# Patient Record
Sex: FEMALE | Race: BLACK OR AFRICAN AMERICAN | Employment: FULL TIME | URBAN - METROPOLITAN AREA
[De-identification: names, ages, dates, MRNs, and addresses within clinical notes are randomized per-mention and may not be internally consistent; named-entity substitution may affect disease eponyms.]

---

## 2017-06-08 PROBLEM — F32.A DEPRESSION: Chronic | Status: ACTIVE | Noted: 2017-06-08

## 2017-06-08 PROBLEM — I10 HYPERTENSION: Chronic | Status: ACTIVE | Noted: 2017-06-08

## 2017-06-08 PROBLEM — J18.9 COMMUNITY ACQUIRED PNEUMONIA: Status: ACTIVE | Noted: 2017-06-08

## 2017-06-08 PROBLEM — J96.00 ACUTE RESPIRATORY FAILURE (HCC): Status: ACTIVE | Noted: 2017-06-08

## 2017-06-10 PROBLEM — E11.9 DM2 (DIABETES MELLITUS, TYPE 2) (HCC): Status: ACTIVE | Noted: 2017-06-10

## 2017-06-10 PROBLEM — K21.9 GERD (GASTROESOPHAGEAL REFLUX DISEASE): Status: ACTIVE | Noted: 2017-06-10

## 2019-07-24 ENCOUNTER — APPOINTMENT (OUTPATIENT)
Dept: GENERAL RADIOLOGY | Age: 49
DRG: 329 | End: 2019-07-24
Payer: COMMERCIAL

## 2019-07-24 ENCOUNTER — APPOINTMENT (OUTPATIENT)
Dept: CT IMAGING | Age: 49
DRG: 329 | End: 2019-07-24
Payer: COMMERCIAL

## 2019-07-24 ENCOUNTER — HOSPITAL ENCOUNTER (INPATIENT)
Age: 49
LOS: 9 days | Discharge: HOME | DRG: 329 | End: 2019-08-02
Attending: EMERGENCY MEDICINE | Admitting: HOSPITALIST
Payer: COMMERCIAL

## 2019-07-24 DIAGNOSIS — K56.609 SBO (SMALL BOWEL OBSTRUCTION) (HCC): Primary | ICD-10-CM

## 2019-07-24 DIAGNOSIS — N39.0 URINARY TRACT INFECTION IN FEMALE: ICD-10-CM

## 2019-07-24 LAB
A/G RATIO: 1.3 (ref 1.1–2.2)
ALBUMIN SERPL-MCNC: 4.7 G/DL (ref 3.4–5)
ALP BLD-CCNC: 75 U/L (ref 40–129)
ALT SERPL-CCNC: 32 U/L (ref 10–40)
AMYLASE: 36 U/L (ref 25–115)
ANION GAP SERPL CALCULATED.3IONS-SCNC: 15 MMOL/L (ref 3–16)
AST SERPL-CCNC: 34 U/L (ref 15–37)
BACTERIA: ABNORMAL /HPF
BASOPHILS ABSOLUTE: 0 K/UL (ref 0–0.2)
BASOPHILS RELATIVE PERCENT: 0.3 %
BILIRUB SERPL-MCNC: 0.5 MG/DL (ref 0–1)
BILIRUBIN URINE: NEGATIVE
BLOOD, URINE: NEGATIVE
BUN BLDV-MCNC: 8 MG/DL (ref 7–20)
CALCIUM SERPL-MCNC: 10.3 MG/DL (ref 8.3–10.6)
CHLORIDE BLD-SCNC: 99 MMOL/L (ref 99–110)
CLARITY: ABNORMAL
CO2: 25 MMOL/L (ref 21–32)
COLOR: YELLOW
CREAT SERPL-MCNC: 0.6 MG/DL (ref 0.6–1.1)
EOSINOPHILS ABSOLUTE: 0.1 K/UL (ref 0–0.6)
EOSINOPHILS RELATIVE PERCENT: 1 %
EPITHELIAL CELLS, UA: 23 /HPF (ref 0–5)
GFR AFRICAN AMERICAN: >60
GFR NON-AFRICAN AMERICAN: >60
GLOBULIN: 3.7 G/DL
GLUCOSE BLD-MCNC: 133 MG/DL (ref 70–99)
GLUCOSE URINE: NEGATIVE MG/DL
HCG(URINE) PREGNANCY TEST: NEGATIVE
HCT VFR BLD CALC: 37.2 % (ref 36–48)
HEMOGLOBIN: 12 G/DL (ref 12–16)
HYALINE CASTS: 7 /LPF (ref 0–8)
KETONES, URINE: ABNORMAL MG/DL
LEUKOCYTE ESTERASE, URINE: NEGATIVE
LIPASE: 20 U/L (ref 13–60)
LYMPHOCYTES ABSOLUTE: 0.9 K/UL (ref 1–5.1)
LYMPHOCYTES RELATIVE PERCENT: 14.7 %
MCH RBC QN AUTO: 26.4 PG (ref 26–34)
MCHC RBC AUTO-ENTMCNC: 32.2 G/DL (ref 31–36)
MCV RBC AUTO: 81.8 FL (ref 80–100)
MICROSCOPIC EXAMINATION: YES
MONOCYTES ABSOLUTE: 0.3 K/UL (ref 0–1.3)
MONOCYTES RELATIVE PERCENT: 4.4 %
NEUTROPHILS ABSOLUTE: 4.9 K/UL (ref 1.7–7.7)
NEUTROPHILS RELATIVE PERCENT: 79.6 %
NITRITE, URINE: POSITIVE
PDW BLD-RTO: 14.1 % (ref 12.4–15.4)
PH UA: 6 (ref 5–8)
PLATELET # BLD: 325 K/UL (ref 135–450)
PMV BLD AUTO: 7.1 FL (ref 5–10.5)
POTASSIUM SERPL-SCNC: 3.4 MMOL/L (ref 3.5–5.1)
PROTEIN UA: 30 MG/DL
RBC # BLD: 4.55 M/UL (ref 4–5.2)
RBC UA: 4 /HPF (ref 0–4)
SODIUM BLD-SCNC: 139 MMOL/L (ref 136–145)
SPECIFIC GRAVITY UA: 1.02 (ref 1–1.03)
TOTAL PROTEIN: 8.4 G/DL (ref 6.4–8.2)
URINE TYPE: ABNORMAL
UROBILINOGEN, URINE: 0.2 E.U./DL
WBC # BLD: 6.2 K/UL (ref 4–11)
WBC UA: 11 /HPF (ref 0–5)

## 2019-07-24 PROCEDURE — 82150 ASSAY OF AMYLASE: CPT

## 2019-07-24 PROCEDURE — 99253 IP/OBS CNSLTJ NEW/EST LOW 45: CPT | Performed by: SURGERY

## 2019-07-24 PROCEDURE — 4500000025 HC ED LEVEL 5 PROCEDURE

## 2019-07-24 PROCEDURE — 96374 THER/PROPH/DIAG INJ IV PUSH: CPT

## 2019-07-24 PROCEDURE — 6360000002 HC RX W HCPCS: Performed by: HOSPITALIST

## 2019-07-24 PROCEDURE — 74018 RADEX ABDOMEN 1 VIEW: CPT

## 2019-07-24 PROCEDURE — 0D9670Z DRAINAGE OF STOMACH WITH DRAINAGE DEVICE, VIA NATURAL OR ARTIFICIAL OPENING: ICD-10-PCS | Performed by: INTERNAL MEDICINE

## 2019-07-24 PROCEDURE — 6360000002 HC RX W HCPCS: Performed by: NURSE PRACTITIONER

## 2019-07-24 PROCEDURE — 6360000002 HC RX W HCPCS: Performed by: EMERGENCY MEDICINE

## 2019-07-24 PROCEDURE — 2580000003 HC RX 258: Performed by: NURSE PRACTITIONER

## 2019-07-24 PROCEDURE — 85025 COMPLETE CBC W/AUTO DIFF WBC: CPT

## 2019-07-24 PROCEDURE — 99285 EMERGENCY DEPT VISIT HI MDM: CPT

## 2019-07-24 PROCEDURE — 1200000000 HC SEMI PRIVATE

## 2019-07-24 PROCEDURE — 6360000002 HC RX W HCPCS: Performed by: SURGERY

## 2019-07-24 PROCEDURE — C9113 INJ PANTOPRAZOLE SODIUM, VIA: HCPCS | Performed by: SURGERY

## 2019-07-24 PROCEDURE — 80053 COMPREHEN METABOLIC PANEL: CPT

## 2019-07-24 PROCEDURE — 2580000003 HC RX 258: Performed by: EMERGENCY MEDICINE

## 2019-07-24 PROCEDURE — 84703 CHORIONIC GONADOTROPIN ASSAY: CPT

## 2019-07-24 PROCEDURE — 6360000004 HC RX CONTRAST MEDICATION: Performed by: NURSE PRACTITIONER

## 2019-07-24 PROCEDURE — 96361 HYDRATE IV INFUSION ADD-ON: CPT

## 2019-07-24 PROCEDURE — 83690 ASSAY OF LIPASE: CPT

## 2019-07-24 PROCEDURE — 2580000003 HC RX 258: Performed by: HOSPITALIST

## 2019-07-24 PROCEDURE — 94760 N-INVAS EAR/PLS OXIMETRY 1: CPT

## 2019-07-24 PROCEDURE — 96376 TX/PRO/DX INJ SAME DRUG ADON: CPT

## 2019-07-24 PROCEDURE — 2500000003 HC RX 250 WO HCPCS: Performed by: HOSPITALIST

## 2019-07-24 PROCEDURE — 96375 TX/PRO/DX INJ NEW DRUG ADDON: CPT

## 2019-07-24 PROCEDURE — 80074 ACUTE HEPATITIS PANEL: CPT

## 2019-07-24 PROCEDURE — 36415 COLL VENOUS BLD VENIPUNCTURE: CPT

## 2019-07-24 PROCEDURE — 81001 URINALYSIS AUTO W/SCOPE: CPT

## 2019-07-24 PROCEDURE — 74177 CT ABD & PELVIS W/CONTRAST: CPT

## 2019-07-24 RX ORDER — 0.9 % SODIUM CHLORIDE 0.9 %
1000 INTRAVENOUS SOLUTION INTRAVENOUS ONCE
Status: COMPLETED | OUTPATIENT
Start: 2019-07-24 | End: 2019-07-24

## 2019-07-24 RX ORDER — MORPHINE SULFATE 4 MG/ML
4 INJECTION, SOLUTION INTRAMUSCULAR; INTRAVENOUS ONCE
Status: COMPLETED | OUTPATIENT
Start: 2019-07-24 | End: 2019-07-24

## 2019-07-24 RX ORDER — LISINOPRIL 40 MG/1
40 TABLET ORAL DAILY
COMMUNITY
Start: 2019-06-25

## 2019-07-24 RX ORDER — SODIUM CHLORIDE 9 MG/ML
INJECTION, SOLUTION INTRAVENOUS CONTINUOUS
Status: DISCONTINUED | OUTPATIENT
Start: 2019-07-24 | End: 2019-07-26

## 2019-07-24 RX ORDER — LABETALOL HYDROCHLORIDE 5 MG/ML
10 INJECTION, SOLUTION INTRAVENOUS EVERY 6 HOURS PRN
Status: DISCONTINUED | OUTPATIENT
Start: 2019-07-24 | End: 2019-07-30

## 2019-07-24 RX ORDER — HYDRALAZINE HYDROCHLORIDE 20 MG/ML
10 INJECTION INTRAMUSCULAR; INTRAVENOUS EVERY 6 HOURS PRN
Status: DISCONTINUED | OUTPATIENT
Start: 2019-07-24 | End: 2019-07-29

## 2019-07-24 RX ORDER — CIPROFLOXACIN 2 MG/ML
400 INJECTION, SOLUTION INTRAVENOUS EVERY 12 HOURS
Status: DISCONTINUED | OUTPATIENT
Start: 2019-07-25 | End: 2019-08-02 | Stop reason: HOSPADM

## 2019-07-24 RX ORDER — CIPROFLOXACIN 2 MG/ML
400 INJECTION, SOLUTION INTRAVENOUS ONCE
Status: COMPLETED | OUTPATIENT
Start: 2019-07-24 | End: 2019-07-24

## 2019-07-24 RX ORDER — ONDANSETRON 2 MG/ML
4 INJECTION INTRAMUSCULAR; INTRAVENOUS ONCE
Status: COMPLETED | OUTPATIENT
Start: 2019-07-24 | End: 2019-07-24

## 2019-07-24 RX ORDER — ONDANSETRON 2 MG/ML
4 INJECTION INTRAMUSCULAR; INTRAVENOUS EVERY 6 HOURS PRN
Status: DISCONTINUED | OUTPATIENT
Start: 2019-07-24 | End: 2019-07-24

## 2019-07-24 RX ORDER — PANTOPRAZOLE SODIUM 40 MG/10ML
40 INJECTION, POWDER, LYOPHILIZED, FOR SOLUTION INTRAVENOUS DAILY
Status: DISCONTINUED | OUTPATIENT
Start: 2019-07-24 | End: 2019-08-02 | Stop reason: HOSPADM

## 2019-07-24 RX ORDER — ONDANSETRON 2 MG/ML
4 INJECTION INTRAMUSCULAR; INTRAVENOUS EVERY 6 HOURS PRN
Status: DISCONTINUED | OUTPATIENT
Start: 2019-07-24 | End: 2019-08-02 | Stop reason: HOSPADM

## 2019-07-24 RX ORDER — MORPHINE SULFATE 4 MG/ML
4 INJECTION, SOLUTION INTRAMUSCULAR; INTRAVENOUS
Status: DISCONTINUED | OUTPATIENT
Start: 2019-07-24 | End: 2019-07-24

## 2019-07-24 RX ORDER — BUPROPION HYDROCHLORIDE 150 MG/1
150 TABLET ORAL EVERY MORNING
COMMUNITY

## 2019-07-24 RX ORDER — ATORVASTATIN CALCIUM 10 MG/1
TABLET, FILM COATED ORAL
Refills: 0 | COMMUNITY
Start: 2019-06-25

## 2019-07-24 RX ORDER — DULOXETIN HYDROCHLORIDE 60 MG/1
60 CAPSULE, DELAYED RELEASE ORAL DAILY
COMMUNITY

## 2019-07-24 RX ORDER — MORPHINE SULFATE 4 MG/ML
4 INJECTION, SOLUTION INTRAMUSCULAR; INTRAVENOUS EVERY 4 HOURS PRN
Status: DISCONTINUED | OUTPATIENT
Start: 2019-07-24 | End: 2019-07-25

## 2019-07-24 RX ORDER — LORAZEPAM 2 MG/ML
1 INJECTION INTRAMUSCULAR ONCE
Status: COMPLETED | OUTPATIENT
Start: 2019-07-24 | End: 2019-07-24

## 2019-07-24 RX ORDER — SODIUM CHLORIDE 0.9 % (FLUSH) 0.9 %
10 SYRINGE (ML) INJECTION EVERY 12 HOURS SCHEDULED
Status: DISCONTINUED | OUTPATIENT
Start: 2019-07-24 | End: 2019-08-02 | Stop reason: HOSPADM

## 2019-07-24 RX ORDER — SODIUM CHLORIDE 0.9 % (FLUSH) 0.9 %
10 SYRINGE (ML) INJECTION PRN
Status: DISCONTINUED | OUTPATIENT
Start: 2019-07-24 | End: 2019-08-02 | Stop reason: HOSPADM

## 2019-07-24 RX ADMIN — LABETALOL HYDROCHLORIDE 10 MG: 5 INJECTION, SOLUTION INTRAVENOUS at 18:38

## 2019-07-24 RX ADMIN — MORPHINE SULFATE 4 MG: 4 INJECTION INTRAVENOUS at 16:46

## 2019-07-24 RX ADMIN — SODIUM CHLORIDE 1000 ML: 9 INJECTION, SOLUTION INTRAVENOUS at 17:46

## 2019-07-24 RX ADMIN — LORAZEPAM 1 MG: 2 INJECTION INTRAMUSCULAR; INTRAVENOUS at 17:40

## 2019-07-24 RX ADMIN — MORPHINE SULFATE 4 MG: 4 INJECTION INTRAVENOUS at 14:36

## 2019-07-24 RX ADMIN — ONDANSETRON 4 MG: 2 INJECTION INTRAMUSCULAR; INTRAVENOUS at 16:46

## 2019-07-24 RX ADMIN — HYDRALAZINE HYDROCHLORIDE 10 MG: 20 INJECTION INTRAMUSCULAR; INTRAVENOUS at 23:51

## 2019-07-24 RX ADMIN — ONDANSETRON 4 MG: 2 INJECTION INTRAMUSCULAR; INTRAVENOUS at 14:36

## 2019-07-24 RX ADMIN — PANTOPRAZOLE SODIUM 40 MG: 40 INJECTION, POWDER, FOR SOLUTION INTRAVENOUS at 20:42

## 2019-07-24 RX ADMIN — SODIUM CHLORIDE 1000 ML: 9 INJECTION, SOLUTION INTRAVENOUS at 14:36

## 2019-07-24 RX ADMIN — CIPROFLOXACIN 400 MG: 2 INJECTION, SOLUTION INTRAVENOUS at 14:36

## 2019-07-24 RX ADMIN — ENOXAPARIN SODIUM 40 MG: 40 INJECTION SUBCUTANEOUS at 20:42

## 2019-07-24 RX ADMIN — ONDANSETRON 4 MG: 2 INJECTION INTRAMUSCULAR; INTRAVENOUS at 20:11

## 2019-07-24 RX ADMIN — IOPAMIDOL 75 ML: 755 INJECTION, SOLUTION INTRAVENOUS at 15:05

## 2019-07-24 RX ADMIN — Medication 10 ML: at 20:11

## 2019-07-24 RX ADMIN — HYDRALAZINE HYDROCHLORIDE 10 MG: 20 INJECTION INTRAMUSCULAR; INTRAVENOUS at 17:36

## 2019-07-24 RX ADMIN — MORPHINE SULFATE 4 MG: 4 INJECTION INTRAVENOUS at 20:11

## 2019-07-24 RX ADMIN — SODIUM CHLORIDE: 9 INJECTION, SOLUTION INTRAVENOUS at 20:16

## 2019-07-24 ASSESSMENT — PAIN SCALES - GENERAL
PAINLEVEL_OUTOF10: 8
PAINLEVEL_OUTOF10: 9
PAINLEVEL_OUTOF10: 9
PAINLEVEL_OUTOF10: 5
PAINLEVEL_OUTOF10: 9
PAINLEVEL_OUTOF10: 9
PAINLEVEL_OUTOF10: 8

## 2019-07-24 ASSESSMENT — PAIN DESCRIPTION - ONSET: ONSET: GRADUAL

## 2019-07-24 ASSESSMENT — ENCOUNTER SYMPTOMS
ABDOMINAL PAIN: 1
DIARRHEA: 0
CHEST TIGHTNESS: 0
VOMITING: 0
NAUSEA: 1
SHORTNESS OF BREATH: 0

## 2019-07-24 ASSESSMENT — PAIN DESCRIPTION - PROGRESSION: CLINICAL_PROGRESSION: NOT CHANGED

## 2019-07-24 ASSESSMENT — PAIN DESCRIPTION - DESCRIPTORS: DESCRIPTORS: CRAMPING;DISCOMFORT

## 2019-07-24 ASSESSMENT — PAIN DESCRIPTION - FREQUENCY: FREQUENCY: INTERMITTENT

## 2019-07-24 ASSESSMENT — PAIN DESCRIPTION - LOCATION: LOCATION: ABDOMEN

## 2019-07-24 ASSESSMENT — PAIN DESCRIPTION - PAIN TYPE: TYPE: ACUTE PAIN

## 2019-07-24 ASSESSMENT — PAIN DESCRIPTION - ORIENTATION: ORIENTATION: MID

## 2019-07-24 NOTE — H&P
HOSPITALISTS HISTORY AND PHYSICAL    7/24/2019 4:34 PM    Patient Information:  Michael Kennedy is a 53 y.o. female [id number]  PCP:  Celio Key MD (Tel: [phone] )    Chief complaint:    Chief Complaint   Patient presents with    Abdominal Pain     Pt to ER reporting diffuse abdominal pain through center of abdomen x 2 days, states she has nausea without vomiting. Denies diarrhea or urinary sx - pt reports hx of \"severe\" constipation        History of Present Illness:  Noelle Acosta is a 53 y.o. female who presented with complaints of abdominal pain. Onset about 2 days ago. Associated with nausea with no vomiting. Patient said abdomen was more distended and pain for the last night. Last bowel movement was yesterday. No fevers no chills no diarrhea. Some abdominal surgery in the past.  But not sure exactly when. No dysuria. No chest pain or shortness of breath. REVIEW OF SYSTEMS:   Constitutional: Negative for fever,chills or night sweats  ENT: Negative for rhinorrhea, epistaxis, hoarseness, sore throat. Respiratory: Negative for shortness of breath,wheezing  Cardiovascular: Negative for chest pain, palpitations   Gastrointestinal: Negative for nausea, vomiting, diarrhea  Genitourinary: Negative for polyuria, dysuria   Hematologic/Lymphatic: Negative for bleeding tendency, easy bruising  Musculoskeletal: Negative for myalgias and arthralgias  Neurologic: Negative for confusion,dysarthria. Skin: Negative for itching,rash, good capillary refill. Psychiatric: Negative for depression,anxiety, agitation. Endocrine: Negative for polydipsia,polyuria,heat /cold intolerance. Past Medical History:   has a past medical history of Anemia, Anxiety, Community acquired pneumonia, Constipation, Crohn's disease ([facility] [address] Depression, Diabetes mellitus ([facility] [address] GERD (gastroesophageal reflux disease), Hypertension, and Reflux.      Past Surgical gallop, rub. No edema in lower extremities  Respiratory: Clear to auscultation bilaterally, no wheeze, good inspiratory effort  Gastrointestinal: Abdomen soft mildly distended bloated with decreased bowel sounds t distended, normal bowel sounds  Musculoskeletal: No cyanosis in digits, neck supple  Neurology: Cranial nerves grossly intact. Alert and oriented in time, place and person. No speech or motor deficits  Psychiatry: Appropriate affect. Not agitated  Skin: Warm, dry, normal turgor, no rash    Labs:  CBC:   Lab Results   Component Value Date    WBC 6.2 07/24/2019    RBC 4.55 07/24/2019    HGB 12.0 07/24/2019    HCT 37.2 07/24/2019    MCV 81.8 07/24/2019    MCH 26.4 07/24/2019    MCHC 32.2 07/24/2019    RDW 14.1 07/24/2019     07/24/2019    MPV 7.1 07/24/2019     BMP:    Lab Results   Component Value Date     07/24/2019    K 3.4 07/24/2019    CL 99 07/24/2019    CO2 25 07/24/2019    BUN 8 07/24/2019    CREATININE 0.6 07/24/2019    CALCIUM 10.3 07/24/2019    GFRAA >60 07/24/2019    GFRAA >60 04/15/2013    LABGLOM >60 07/24/2019    GLUCOSE 133 07/24/2019       Chest Xray:   EKG:    I visualized CXR images and EKG strip     Discussed  with      Problem List  Principal Problem:    SBO (small bowel obstruction) ([facility])  Active Problems:    Hypertension    Depression    GERD (gastroesophageal reflux disease)    DM2 (diabetes mellitus, type 2) ([facility])    UTI (urinary tract infection)  Resolved Problems:    * No resolved hospital problems. *        Assessment/Plan    Small bowel obstruction  -Conservative treatment at this time  -NG tube in place IV fluid pain control  -Surgery team to see further recommendation pe per them    2. UTI  -Treat with Cipro  -Follow-up on cultures. 3.  Hypertension  -Elevated in the ER  -We will use IV as needed medication for now once patient able to tolerate p.o. we will resume her home meds    4. Diabetes  -N.p.o. status for now monitor for hypoglycemia    5. Depression    Hold all p.o. medication for now until able to take anything by mouth        Caterina Arshad MD    7/24/2019 4:34 PM

## 2019-07-24 NOTE — ED PROVIDER NOTES
All other systems reviewed and are negative. Positives and Pertinent negatives as per HPI. Except as noted abovein the ROS, all other systems were reviewed and negative. PAST MEDICAL HISTORY     Past Medical History:   Diagnosis Date    Anemia     Anxiety     Community acquired pneumonia 2017    Constipation     [facility]'s [facility] ([facility] [address]     Depression     Diabetes mellitus ([facility] [address]     GERD (gastroesophageal reflux disease)     Hypertension     Reflux          SURGICAL HISTORY     Past Surgical History:   Procedure Laterality Date    APPENDECTOMY       SECTION      HEMORRHOID SURGERY      HYSTERECTOMY      ISELA AND BSO  2010    ROBOTIC LAPARSCOPIC TOTAL HYSTERECTOMY         CURRENTMEDICATIONS       Previous Medications    ALPRAZOLAM (XANAX) 0.5 MG TABLET    Take 0.5 mg by mouth nightly as needed for Sleep    ATORVASTATIN (LIPITOR) 10 MG TABLET    TK 1 T PO QHS    BUPROPION (WELLBUTRIN XL) 150 MG EXTENDED RELEASE TABLET    Take 150 mg by mouth every morning    DOCUSATE SODIUM (COLACE) 100 MG CAPSULE    Take 100 mg by mouth every 48 hours as needed. DULOXETINE (CYMBALTA) 60 MG EXTENDED RELEASE CAPSULE    Take 60 mg by mouth daily    FUROSEMIDE (LASIX) 20 MG TABLET    Take 20 mg by mouth daily. LISINOPRIL (PRINIVIL;ZESTRIL) 40 MG TABLET    Take 40 mg by mouth daily     METFORMIN (GLUCOPHAGE) 1000 MG TABLET    Take 1,000 mg by mouth 2 times daily (with meals). METOPROLOL (TOPROL-XL) 100 MG XL TABLET    Take 100 mg by mouth daily. PIOGLITAZONE (ACTOS) 30 MG TABLET    Take 30 mg by mouth daily.          ALLERGIES     Penicillins; Clindamycin/lincomycin; and Sulfa antibiotics    FAMILYHISTORY       Family History   Problem Relation Age of Onset    High Blood Pressure Mother     Diabetes Mother     Stroke Mother     Cancer Mother         breast    High Blood Pressure Father           SOCIAL HISTORY       Social History     Socioeconomic History    Marital status:

## 2019-07-24 NOTE — ED NOTES
Attempted NG x 1 , pt started with emesis and put out tube.  Ruthy MURPHY attempted and pt wanted to wait, MD order pt Saulo Beltran 137, RN  07/24/19 7798

## 2019-07-25 ENCOUNTER — APPOINTMENT (OUTPATIENT)
Dept: GENERAL RADIOLOGY | Age: 49
DRG: 329 | End: 2019-07-25
Payer: COMMERCIAL

## 2019-07-25 PROCEDURE — 1200000000 HC SEMI PRIVATE

## 2019-07-25 PROCEDURE — 74250 X-RAY XM SM INT 1CNTRST STD: CPT

## 2019-07-25 PROCEDURE — 6360000004 HC RX CONTRAST MEDICATION

## 2019-07-25 PROCEDURE — 74019 RADEX ABDOMEN 2 VIEWS: CPT

## 2019-07-25 PROCEDURE — 6360000002 HC RX W HCPCS: Performed by: SURGERY

## 2019-07-25 PROCEDURE — 2580000003 HC RX 258: Performed by: HOSPITALIST

## 2019-07-25 PROCEDURE — 6360000002 HC RX W HCPCS: Performed by: HOSPITALIST

## 2019-07-25 PROCEDURE — C9113 INJ PANTOPRAZOLE SODIUM, VIA: HCPCS | Performed by: SURGERY

## 2019-07-25 PROCEDURE — 2500000003 HC RX 250 WO HCPCS: Performed by: HOSPITALIST

## 2019-07-25 PROCEDURE — 94760 N-INVAS EAR/PLS OXIMETRY 1: CPT

## 2019-07-25 PROCEDURE — 99232 SBSQ HOSP IP/OBS MODERATE 35: CPT | Performed by: SURGERY

## 2019-07-25 RX ORDER — MORPHINE SULFATE 4 MG/ML
4 INJECTION, SOLUTION INTRAMUSCULAR; INTRAVENOUS
Status: DISCONTINUED | OUTPATIENT
Start: 2019-07-25 | End: 2019-08-02 | Stop reason: HOSPADM

## 2019-07-25 RX ORDER — METOPROLOL TARTRATE 5 MG/5ML
5 INJECTION INTRAVENOUS EVERY 6 HOURS
Status: DISCONTINUED | OUTPATIENT
Start: 2019-07-25 | End: 2019-07-27

## 2019-07-25 RX ORDER — PROCHLORPERAZINE EDISYLATE 5 MG/ML
10 INJECTION INTRAMUSCULAR; INTRAVENOUS EVERY 6 HOURS PRN
Status: DISCONTINUED | OUTPATIENT
Start: 2019-07-25 | End: 2019-08-02 | Stop reason: HOSPADM

## 2019-07-25 RX ADMIN — MORPHINE SULFATE 4 MG: 4 INJECTION INTRAVENOUS at 13:40

## 2019-07-25 RX ADMIN — SODIUM CHLORIDE: 9 INJECTION, SOLUTION INTRAVENOUS at 06:15

## 2019-07-25 RX ADMIN — ONDANSETRON 4 MG: 2 INJECTION INTRAMUSCULAR; INTRAVENOUS at 04:22

## 2019-07-25 RX ADMIN — ONDANSETRON 4 MG: 2 INJECTION INTRAMUSCULAR; INTRAVENOUS at 10:35

## 2019-07-25 RX ADMIN — CIPROFLOXACIN 400 MG: 2 INJECTION, SOLUTION INTRAVENOUS at 13:41

## 2019-07-25 RX ADMIN — METOPROLOL TARTRATE 5 MG: 1 INJECTION, SOLUTION INTRAVENOUS at 20:21

## 2019-07-25 RX ADMIN — MORPHINE SULFATE 4 MG: 4 INJECTION INTRAVENOUS at 04:22

## 2019-07-25 RX ADMIN — IOHEXOL 200 ML: 350 INJECTION, SOLUTION INTRAVENOUS at 15:33

## 2019-07-25 RX ADMIN — HYDRALAZINE HYDROCHLORIDE 10 MG: 20 INJECTION INTRAMUSCULAR; INTRAVENOUS at 07:38

## 2019-07-25 RX ADMIN — Medication 10 ML: at 20:44

## 2019-07-25 RX ADMIN — SODIUM CHLORIDE: 9 INJECTION, SOLUTION INTRAVENOUS at 20:22

## 2019-07-25 RX ADMIN — MORPHINE SULFATE 4 MG: 4 INJECTION INTRAVENOUS at 00:17

## 2019-07-25 RX ADMIN — LABETALOL HYDROCHLORIDE 10 MG: 5 INJECTION, SOLUTION INTRAVENOUS at 06:41

## 2019-07-25 RX ADMIN — METOPROLOL TARTRATE 5 MG: 1 INJECTION, SOLUTION INTRAVENOUS at 13:40

## 2019-07-25 RX ADMIN — MORPHINE SULFATE 4 MG: 4 INJECTION INTRAVENOUS at 07:44

## 2019-07-25 RX ADMIN — PANTOPRAZOLE SODIUM 40 MG: 40 INJECTION, POWDER, FOR SOLUTION INTRAVENOUS at 07:44

## 2019-07-25 RX ADMIN — LABETALOL HYDROCHLORIDE 10 MG: 5 INJECTION, SOLUTION INTRAVENOUS at 00:33

## 2019-07-25 RX ADMIN — ONDANSETRON 4 MG: 2 INJECTION INTRAMUSCULAR; INTRAVENOUS at 20:22

## 2019-07-25 RX ADMIN — MORPHINE SULFATE 4 MG: 4 INJECTION INTRAVENOUS at 10:35

## 2019-07-25 RX ADMIN — CIPROFLOXACIN 400 MG: 2 INJECTION, SOLUTION INTRAVENOUS at 03:26

## 2019-07-25 RX ADMIN — MORPHINE SULFATE 4 MG: 4 INJECTION INTRAVENOUS at 20:22

## 2019-07-25 ASSESSMENT — PAIN SCALES - GENERAL
PAINLEVEL_OUTOF10: 0
PAINLEVEL_OUTOF10: 9
PAINLEVEL_OUTOF10: 0
PAINLEVEL_OUTOF10: 9
PAINLEVEL_OUTOF10: 10
PAINLEVEL_OUTOF10: 7
PAINLEVEL_OUTOF10: 0
PAINLEVEL_OUTOF10: 0
PAINLEVEL_OUTOF10: 7

## 2019-07-25 ASSESSMENT — PAIN DESCRIPTION - LOCATION: LOCATION: ABDOMEN

## 2019-07-25 ASSESSMENT — PAIN DESCRIPTION - PAIN TYPE: TYPE: ACUTE PAIN

## 2019-07-25 ASSESSMENT — PAIN DESCRIPTION - ORIENTATION: ORIENTATION: MID

## 2019-07-25 NOTE — PROGRESS NOTES
sounds  Musculoskeletal: No cyanosis in digits, neck supple  Neurology: CN 2-12 grossly intact. No speech or motor deficits  Psych: Normal affect. Alert and oriented in time, place and person  Skin: Warm, dry, normal turgor    Labs and Tests:  CBC:   Recent Labs     07/24/19  1322   WBC 6.2   HGB 12.0        BMP:  Recent Labs     07/24/19  1322      K 3.4*   CL 99   CO2 25   BUN 8   CREATININE 0.6   GLUCOSE 133*     Hepatic: Recent Labs     07/24/19  1322   AST 34   ALT 32   BILITOT 0.5   ALKPHOS 75       Discussed care with family and patient             Spent 30  minutes with patient and family at bedside and on unit reviewing medical records and labs, spent greater than 50% time counseling patient and family on diagnosis and plan   Problem List  Principal Problem:    SBO (small bowel obstruction) ([facility] [address]  Active Problems:    Hypertension    Depression    GERD (gastroesophageal reflux disease)    DM2 (diabetes mellitus, type 2) ([facility] [address]    UTI (urinary tract infection)  Resolved Problems:    * No resolved hospital problems.  *       Assessment & Plan:   SBO  - continue ng compression  - no BM or flatus  - per gen surg    Uncontrolled HTN  Continue to adjust medication  - will resume po meds once able to     Diet: Diet NPO Effective Now Exceptions are: Ice Chips, Popsicles  Code:Full Code  DVT PPX lovenox       Kim Linares MD   7/25/2019 9:54 AM

## 2019-07-25 NOTE — CARE COORDINATION
Discharge Planning Assessment       discharge planner met with patient to discuss reason for admission, current living situation, and potential needs at the time of discharge    Demographics/Insurance verified Yes/No  Yes     Current type of dwelling: House  2STE    Patient from ECF/ confirmed with: No    Living arrangements: Lives with     Level of function/Support: Independent prior to admission    PCP: Dr. Blackwell Her    Last Visit to PCP: 4-5 months ago    DME: No    Active with any community resources/agencies/skilled home care: No    Medication compliance issues: No    Financial issues that could impact healthcare: No    Transportation at the time of discharge: Family    Tentative discharge plan: Anticipate discharge to home with no follow-up care needs. Patient will continue to be followed for support and discharge planning.      Electronically signed by ELLA Solorio on 7/25/2019 at 12:04 PM

## 2019-07-26 ENCOUNTER — ANESTHESIA EVENT (OUTPATIENT)
Dept: OPERATING ROOM | Age: 49
DRG: 329 | End: 2019-07-26
Payer: COMMERCIAL

## 2019-07-26 ENCOUNTER — APPOINTMENT (OUTPATIENT)
Dept: GENERAL RADIOLOGY | Age: 49
DRG: 329 | End: 2019-07-26
Payer: COMMERCIAL

## 2019-07-26 ENCOUNTER — ANESTHESIA (OUTPATIENT)
Dept: OPERATING ROOM | Age: 49
DRG: 329 | End: 2019-07-26
Payer: COMMERCIAL

## 2019-07-26 VITALS
OXYGEN SATURATION: 99 % | TEMPERATURE: 97.5 F | RESPIRATION RATE: 1 BRPM | SYSTOLIC BLOOD PRESSURE: 202 MMHG | DIASTOLIC BLOOD PRESSURE: 106 MMHG

## 2019-07-26 LAB
A/G RATIO: 1.1 (ref 1.1–2.2)
ALBUMIN SERPL-MCNC: 3.3 G/DL (ref 3.4–5)
ALP BLD-CCNC: 59 U/L (ref 40–129)
ALT SERPL-CCNC: 17 U/L (ref 10–40)
ANION GAP SERPL CALCULATED.3IONS-SCNC: 10 MMOL/L (ref 3–16)
AST SERPL-CCNC: 20 U/L (ref 15–37)
BASE EXCESS ARTERIAL: -1 (ref -3–3)
BASE EXCESS ARTERIAL: 0 (ref -3–3)
BILIRUB SERPL-MCNC: 0.4 MG/DL (ref 0–1)
BUN BLDV-MCNC: 9 MG/DL (ref 7–20)
CALCIUM IONIZED: 1.18 MMOL/L (ref 1.12–1.32)
CALCIUM SERPL-MCNC: 8.2 MG/DL (ref 8.3–10.6)
CHLORIDE BLD-SCNC: 106 MMOL/L (ref 99–110)
CO2: 25 MMOL/L (ref 21–32)
CREAT SERPL-MCNC: 0.6 MG/DL (ref 0.6–1.1)
GFR AFRICAN AMERICAN: >60
GFR AFRICAN AMERICAN: >60
GFR NON-AFRICAN AMERICAN: >60
GFR NON-AFRICAN AMERICAN: >60
GLOBULIN: 3 G/DL
GLUCOSE BLD-MCNC: 117 MG/DL (ref 70–99)
GLUCOSE BLD-MCNC: 129 MG/DL (ref 70–99)
GLUCOSE BLD-MCNC: 135 MG/DL (ref 70–99)
GLUCOSE BLD-MCNC: 149 MG/DL (ref 70–99)
GLUCOSE BLD-MCNC: 154 MG/DL (ref 70–99)
GLUCOSE BLD-MCNC: 158 MG/DL (ref 70–99)
HAV IGM SER IA-ACNC: NORMAL
HCO3 ARTERIAL: 25.7 MMOL/L (ref 21–29)
HCO3 ARTERIAL: 25.8 MMOL/L (ref 21–29)
HCT VFR BLD CALC: 34.5 % (ref 36–48)
HEMOGLOBIN: 11.2 G/DL (ref 12–16)
HEMOGLOBIN: 11.6 GM/DL (ref 12–16)
HEPATITIS B CORE IGM ANTIBODY: NORMAL
HEPATITIS B SURFACE ANTIGEN INTERPRETATION: NORMAL
HEPATITIS C ANTIBODY INTERPRETATION: NORMAL
LACTATE: 1.61 MMOL/L (ref 0.4–2)
MCH RBC QN AUTO: 27 PG (ref 26–34)
MCHC RBC AUTO-ENTMCNC: 32.6 G/DL (ref 31–36)
MCV RBC AUTO: 82.6 FL (ref 80–100)
O2 SAT, ARTERIAL: 95 % (ref 93–100)
O2 SAT, ARTERIAL: 97 % (ref 93–100)
PCO2 ARTERIAL: 48.9 MM HG (ref 35–45)
PCO2 ARTERIAL: 50.2 MM HG (ref 35–45)
PDW BLD-RTO: 14.6 % (ref 12.4–15.4)
PERFORMED ON: ABNORMAL
PH ARTERIAL: 7.32 (ref 7.35–7.45)
PH ARTERIAL: 7.33 (ref 7.35–7.45)
PLATELET # BLD: 298 K/UL (ref 135–450)
PMV BLD AUTO: 6.9 FL (ref 5–10.5)
PO2 ARTERIAL: 85.9 MM HG (ref 75–108)
PO2 ARTERIAL: 98.1 MM HG (ref 75–108)
POC ANION GAP: 9
POC CHLORIDE: 106 MMOL/L (ref 99–110)
POC CREATININE: 0.7 MG/DL (ref 0.6–1.1)
POC HEMATOCRIT: 34 % (ref 36–48)
POC POTASSIUM: 3.5 MMOL/L (ref 3.5–5.1)
POC SAMPLE TYPE: ABNORMAL
POC SAMPLE TYPE: ABNORMAL
POC SODIUM: 141 MMOL/L (ref 136–145)
POTASSIUM SERPL-SCNC: 3.8 MMOL/L (ref 3.5–5.1)
RBC # BLD: 4.17 M/UL (ref 4–5.2)
SODIUM BLD-SCNC: 141 MMOL/L (ref 136–145)
TCO2 ARTERIAL: 27 MMOL/L
TCO2 ARTERIAL: 27 MMOL/L
TOTAL PROTEIN: 6.3 G/DL (ref 6.4–8.2)
WBC # BLD: 2.3 K/UL (ref 4–11)

## 2019-07-26 PROCEDURE — 6360000002 HC RX W HCPCS

## 2019-07-26 PROCEDURE — 85014 HEMATOCRIT: CPT

## 2019-07-26 PROCEDURE — 80053 COMPREHEN METABOLIC PANEL: CPT

## 2019-07-26 PROCEDURE — 6360000002 HC RX W HCPCS: Performed by: NURSE ANESTHETIST, CERTIFIED REGISTERED

## 2019-07-26 PROCEDURE — 82330 ASSAY OF CALCIUM: CPT

## 2019-07-26 PROCEDURE — 6360000002 HC RX W HCPCS: Performed by: HOSPITALIST

## 2019-07-26 PROCEDURE — 84132 ASSAY OF SERUM POTASSIUM: CPT

## 2019-07-26 PROCEDURE — 2709999900 HC NON-CHARGEABLE SUPPLY: Performed by: SURGERY

## 2019-07-26 PROCEDURE — 6360000002 HC RX W HCPCS: Performed by: SURGERY

## 2019-07-26 PROCEDURE — 82565 ASSAY OF CREATININE: CPT

## 2019-07-26 PROCEDURE — 2580000003 HC RX 258: Performed by: ANESTHESIOLOGY

## 2019-07-26 PROCEDURE — 3700000001 HC ADD 15 MINUTES (ANESTHESIA): Performed by: SURGERY

## 2019-07-26 PROCEDURE — 2580000003 HC RX 258: Performed by: SURGERY

## 2019-07-26 PROCEDURE — 44205 LAP COLECTOMY PART W/ILEUM: CPT | Performed by: SURGERY

## 2019-07-26 PROCEDURE — 94770 HC ETCO2 MONITOR DAILY: CPT

## 2019-07-26 PROCEDURE — 82947 ASSAY GLUCOSE BLOOD QUANT: CPT

## 2019-07-26 PROCEDURE — 2700000000 HC OXYGEN THERAPY PER DAY

## 2019-07-26 PROCEDURE — 83605 ASSAY OF LACTIC ACID: CPT

## 2019-07-26 PROCEDURE — 36415 COLL VENOUS BLD VENIPUNCTURE: CPT

## 2019-07-26 PROCEDURE — 94002 VENT MGMT INPAT INIT DAY: CPT

## 2019-07-26 PROCEDURE — 74019 RADEX ABDOMEN 2 VIEWS: CPT

## 2019-07-26 PROCEDURE — 2500000003 HC RX 250 WO HCPCS: Performed by: NURSE ANESTHETIST, CERTIFIED REGISTERED

## 2019-07-26 PROCEDURE — 2000000000 HC ICU R&B

## 2019-07-26 PROCEDURE — 2580000003 HC RX 258: Performed by: INTERNAL MEDICINE

## 2019-07-26 PROCEDURE — 7100000001 HC PACU RECOVERY - ADDTL 15 MIN: Performed by: SURGERY

## 2019-07-26 PROCEDURE — 94761 N-INVAS EAR/PLS OXIMETRY MLT: CPT

## 2019-07-26 PROCEDURE — 6360000002 HC RX W HCPCS: Performed by: ANESTHESIOLOGY

## 2019-07-26 PROCEDURE — 0DBH0ZZ EXCISION OF CECUM, OPEN APPROACH: ICD-10-PCS | Performed by: SURGERY

## 2019-07-26 PROCEDURE — 2580000003 HC RX 258: Performed by: NURSE ANESTHETIST, CERTIFIED REGISTERED

## 2019-07-26 PROCEDURE — 3600000004 HC SURGERY LEVEL 4 BASE: Performed by: SURGERY

## 2019-07-26 PROCEDURE — 2500000003 HC RX 250 WO HCPCS: Performed by: HOSPITALIST

## 2019-07-26 PROCEDURE — 2580000003 HC RX 258: Performed by: HOSPITALIST

## 2019-07-26 PROCEDURE — 84295 ASSAY OF SERUM SODIUM: CPT

## 2019-07-26 PROCEDURE — 2720000010 HC SURG SUPPLY STERILE: Performed by: SURGERY

## 2019-07-26 PROCEDURE — 5A1945Z RESPIRATORY VENTILATION, 24-96 CONSECUTIVE HOURS: ICD-10-PCS | Performed by: RADIOLOGY

## 2019-07-26 PROCEDURE — 71045 X-RAY EXAM CHEST 1 VIEW: CPT

## 2019-07-26 PROCEDURE — 3700000000 HC ANESTHESIA ATTENDED CARE: Performed by: SURGERY

## 2019-07-26 PROCEDURE — 88307 TISSUE EXAM BY PATHOLOGIST: CPT

## 2019-07-26 PROCEDURE — 7100000000 HC PACU RECOVERY - FIRST 15 MIN: Performed by: SURGERY

## 2019-07-26 PROCEDURE — 82435 ASSAY OF BLOOD CHLORIDE: CPT

## 2019-07-26 PROCEDURE — 3600000014 HC SURGERY LEVEL 4 ADDTL 15MIN: Performed by: SURGERY

## 2019-07-26 PROCEDURE — 36600 WITHDRAWAL OF ARTERIAL BLOOD: CPT

## 2019-07-26 PROCEDURE — 2500000003 HC RX 250 WO HCPCS: Performed by: SURGERY

## 2019-07-26 PROCEDURE — C9113 INJ PANTOPRAZOLE SODIUM, VIA: HCPCS | Performed by: SURGERY

## 2019-07-26 PROCEDURE — 6360000002 HC RX W HCPCS: Performed by: INTERNAL MEDICINE

## 2019-07-26 PROCEDURE — 0BH17EZ INSERTION OF ENDOTRACHEAL AIRWAY INTO TRACHEA, VIA NATURAL OR ARTIFICIAL OPENING: ICD-10-PCS | Performed by: RADIOLOGY

## 2019-07-26 PROCEDURE — 82803 BLOOD GASES ANY COMBINATION: CPT

## 2019-07-26 PROCEDURE — 85027 COMPLETE CBC AUTOMATED: CPT

## 2019-07-26 PROCEDURE — 94750 HC PULMONARY COMPLIANCE STUDY: CPT

## 2019-07-26 PROCEDURE — 99255 IP/OBS CONSLTJ NEW/EST HI 80: CPT | Performed by: INTERNAL MEDICINE

## 2019-07-26 RX ORDER — NICOTINE POLACRILEX 4 MG
15 LOZENGE BUCCAL PRN
Status: DISCONTINUED | OUTPATIENT
Start: 2019-07-26 | End: 2019-08-02 | Stop reason: HOSPADM

## 2019-07-26 RX ORDER — MAGNESIUM HYDROXIDE 1200 MG/15ML
LIQUID ORAL CONTINUOUS PRN
Status: COMPLETED | OUTPATIENT
Start: 2019-07-26 | End: 2019-07-26

## 2019-07-26 RX ORDER — SUCCINYLCHOLINE CHLORIDE 20 MG/ML
INJECTION INTRAMUSCULAR; INTRAVENOUS PRN
Status: DISCONTINUED | OUTPATIENT
Start: 2019-07-26 | End: 2019-07-26 | Stop reason: SDUPTHER

## 2019-07-26 RX ORDER — DEXTROSE MONOHYDRATE 50 MG/ML
100 INJECTION, SOLUTION INTRAVENOUS PRN
Status: DISCONTINUED | OUTPATIENT
Start: 2019-07-26 | End: 2019-08-02 | Stop reason: HOSPADM

## 2019-07-26 RX ORDER — ROCURONIUM BROMIDE 10 MG/ML
INJECTION, SOLUTION INTRAVENOUS PRN
Status: DISCONTINUED | OUTPATIENT
Start: 2019-07-26 | End: 2019-07-26 | Stop reason: SDUPTHER

## 2019-07-26 RX ORDER — MIDAZOLAM HYDROCHLORIDE 1 MG/ML
2 INJECTION INTRAMUSCULAR; INTRAVENOUS ONCE
Status: COMPLETED | OUTPATIENT
Start: 2019-07-26 | End: 2019-07-26

## 2019-07-26 RX ORDER — LIDOCAINE HYDROCHLORIDE 20 MG/ML
INJECTION, SOLUTION EPIDURAL; INFILTRATION; INTRACAUDAL; PERINEURAL PRN
Status: DISCONTINUED | OUTPATIENT
Start: 2019-07-26 | End: 2019-07-26 | Stop reason: SDUPTHER

## 2019-07-26 RX ORDER — KETAMINE HCL IN NACL, ISO-OSM 100MG/10ML
SYRINGE (ML) INJECTION PRN
Status: DISCONTINUED | OUTPATIENT
Start: 2019-07-26 | End: 2019-07-26 | Stop reason: SDUPTHER

## 2019-07-26 RX ORDER — DEXTROSE MONOHYDRATE 25 G/50ML
12.5 INJECTION, SOLUTION INTRAVENOUS PRN
Status: DISCONTINUED | OUTPATIENT
Start: 2019-07-26 | End: 2019-08-02 | Stop reason: HOSPADM

## 2019-07-26 RX ORDER — MIDAZOLAM HYDROCHLORIDE 1 MG/ML
INJECTION INTRAMUSCULAR; INTRAVENOUS PRN
Status: DISCONTINUED | OUTPATIENT
Start: 2019-07-26 | End: 2019-07-26 | Stop reason: SDUPTHER

## 2019-07-26 RX ORDER — ONDANSETRON 2 MG/ML
4 INJECTION INTRAMUSCULAR; INTRAVENOUS
Status: DISCONTINUED | OUTPATIENT
Start: 2019-07-26 | End: 2019-07-26 | Stop reason: HOSPADM

## 2019-07-26 RX ORDER — PROPOFOL 10 MG/ML
INJECTION, EMULSION INTRAVENOUS PRN
Status: DISCONTINUED | OUTPATIENT
Start: 2019-07-26 | End: 2019-07-26 | Stop reason: SDUPTHER

## 2019-07-26 RX ORDER — ACETAMINOPHEN 10 MG/ML
INJECTION, SOLUTION INTRAVENOUS PRN
Status: DISCONTINUED | OUTPATIENT
Start: 2019-07-26 | End: 2019-07-26 | Stop reason: SDUPTHER

## 2019-07-26 RX ORDER — NEOSTIGMINE METHYLSULFATE 5 MG/5 ML
SYRINGE (ML) INTRAVENOUS PRN
Status: DISCONTINUED | OUTPATIENT
Start: 2019-07-26 | End: 2019-07-26 | Stop reason: SDUPTHER

## 2019-07-26 RX ORDER — NALOXONE HYDROCHLORIDE 0.4 MG/ML
0.4 INJECTION, SOLUTION INTRAMUSCULAR; INTRAVENOUS; SUBCUTANEOUS PRN
Status: DISCONTINUED | OUTPATIENT
Start: 2019-07-26 | End: 2019-07-29

## 2019-07-26 RX ORDER — LIDOCAINE HYDROCHLORIDE 10 MG/ML
1 INJECTION, SOLUTION EPIDURAL; INFILTRATION; INTRACAUDAL; PERINEURAL
Status: ACTIVE | OUTPATIENT
Start: 2019-07-26 | End: 2019-07-26

## 2019-07-26 RX ORDER — HYDROMORPHONE HCL 110MG/55ML
0.5 PATIENT CONTROLLED ANALGESIA SYRINGE INTRAVENOUS EVERY 5 MIN PRN
Status: COMPLETED | OUTPATIENT
Start: 2019-07-26 | End: 2019-07-26

## 2019-07-26 RX ORDER — DEXAMETHASONE SODIUM PHOSPHATE 4 MG/ML
INJECTION, SOLUTION INTRA-ARTICULAR; INTRALESIONAL; INTRAMUSCULAR; INTRAVENOUS; SOFT TISSUE PRN
Status: DISCONTINUED | OUTPATIENT
Start: 2019-07-26 | End: 2019-07-26 | Stop reason: SDUPTHER

## 2019-07-26 RX ORDER — METOPROLOL TARTRATE 5 MG/5ML
INJECTION INTRAVENOUS PRN
Status: DISCONTINUED | OUTPATIENT
Start: 2019-07-26 | End: 2019-07-26 | Stop reason: SDUPTHER

## 2019-07-26 RX ORDER — ONDANSETRON 2 MG/ML
INJECTION INTRAMUSCULAR; INTRAVENOUS PRN
Status: DISCONTINUED | OUTPATIENT
Start: 2019-07-26 | End: 2019-07-26 | Stop reason: SDUPTHER

## 2019-07-26 RX ORDER — POTASSIUM CHLORIDE AND SODIUM CHLORIDE 900; 300 MG/100ML; MG/100ML
INJECTION, SOLUTION INTRAVENOUS CONTINUOUS
Status: DISCONTINUED | OUTPATIENT
Start: 2019-07-26 | End: 2019-07-26

## 2019-07-26 RX ORDER — GLYCOPYRROLATE 0.2 MG/ML
INJECTION INTRAMUSCULAR; INTRAVENOUS PRN
Status: DISCONTINUED | OUTPATIENT
Start: 2019-07-26 | End: 2019-07-26 | Stop reason: SDUPTHER

## 2019-07-26 RX ORDER — SODIUM CHLORIDE, SODIUM LACTATE, POTASSIUM CHLORIDE, CALCIUM CHLORIDE 600; 310; 30; 20 MG/100ML; MG/100ML; MG/100ML; MG/100ML
INJECTION, SOLUTION INTRAVENOUS CONTINUOUS PRN
Status: DISCONTINUED | OUTPATIENT
Start: 2019-07-26 | End: 2019-07-26 | Stop reason: SDUPTHER

## 2019-07-26 RX ORDER — HYDROCODONE BITARTRATE AND ACETAMINOPHEN 5; 325 MG/1; MG/1
1 TABLET ORAL
Status: DISCONTINUED | OUTPATIENT
Start: 2019-07-26 | End: 2019-07-26 | Stop reason: HOSPADM

## 2019-07-26 RX ORDER — HYDROMORPHONE HCL 110MG/55ML
0.25 PATIENT CONTROLLED ANALGESIA SYRINGE INTRAVENOUS EVERY 5 MIN PRN
Status: DISCONTINUED | OUTPATIENT
Start: 2019-07-26 | End: 2019-07-26 | Stop reason: HOSPADM

## 2019-07-26 RX ORDER — PROPOFOL 10 MG/ML
10 INJECTION, EMULSION INTRAVENOUS
Status: DISCONTINUED | OUTPATIENT
Start: 2019-07-26 | End: 2019-08-02 | Stop reason: HOSPADM

## 2019-07-26 RX ORDER — ACETAMINOPHEN 10 MG/ML
1000 INJECTION, SOLUTION INTRAVENOUS ONCE
Status: DISCONTINUED | OUTPATIENT
Start: 2019-07-26 | End: 2019-07-29 | Stop reason: ALTCHOICE

## 2019-07-26 RX ORDER — HYDROMORPHONE HCL 110MG/55ML
PATIENT CONTROLLED ANALGESIA SYRINGE INTRAVENOUS PRN
Status: DISCONTINUED | OUTPATIENT
Start: 2019-07-26 | End: 2019-07-26 | Stop reason: SDUPTHER

## 2019-07-26 RX ORDER — MIDAZOLAM HYDROCHLORIDE 1 MG/ML
INJECTION INTRAMUSCULAR; INTRAVENOUS
Status: COMPLETED
Start: 2019-07-26 | End: 2019-07-26

## 2019-07-26 RX ORDER — ACETAMINOPHEN 10 MG/ML
1000 INJECTION, SOLUTION INTRAVENOUS EVERY 8 HOURS
Status: DISCONTINUED | OUTPATIENT
Start: 2019-07-26 | End: 2019-07-29 | Stop reason: ALTCHOICE

## 2019-07-26 RX ORDER — MAGNESIUM SULFATE HEPTAHYDRATE 500 MG/ML
INJECTION, SOLUTION INTRAMUSCULAR; INTRAVENOUS PRN
Status: DISCONTINUED | OUTPATIENT
Start: 2019-07-26 | End: 2019-07-26 | Stop reason: SDUPTHER

## 2019-07-26 RX ORDER — FENTANYL CITRATE 50 UG/ML
INJECTION, SOLUTION INTRAMUSCULAR; INTRAVENOUS PRN
Status: DISCONTINUED | OUTPATIENT
Start: 2019-07-26 | End: 2019-07-26 | Stop reason: SDUPTHER

## 2019-07-26 RX ORDER — SODIUM CHLORIDE 9 MG/ML
INJECTION, SOLUTION INTRAVENOUS CONTINUOUS
Status: DISCONTINUED | OUTPATIENT
Start: 2019-07-26 | End: 2019-07-26

## 2019-07-26 RX ADMIN — FENTANYL CITRATE 0.5 MCG/KG/HR: 50 INJECTION INTRAVENOUS at 12:44

## 2019-07-26 RX ADMIN — PROPOFOL 20 MG: 10 INJECTION, EMULSION INTRAVENOUS at 12:04

## 2019-07-26 RX ADMIN — PHENYLEPHRINE HYDROCHLORIDE 100 MCG: 10 INJECTION INTRAVENOUS at 09:55

## 2019-07-26 RX ADMIN — PROPOFOL 20 MG: 10 INJECTION, EMULSION INTRAVENOUS at 12:00

## 2019-07-26 RX ADMIN — HYDROMORPHONE HYDROCHLORIDE 0.5 MG: 2 INJECTION INTRAMUSCULAR; INTRAVENOUS; SUBCUTANEOUS at 12:35

## 2019-07-26 RX ADMIN — LABETALOL HYDROCHLORIDE 10 MG: 5 INJECTION, SOLUTION INTRAVENOUS at 05:43

## 2019-07-26 RX ADMIN — Medication 30 MG: at 09:55

## 2019-07-26 RX ADMIN — PANTOPRAZOLE SODIUM 40 MG: 40 INJECTION, POWDER, FOR SOLUTION INTRAVENOUS at 08:45

## 2019-07-26 RX ADMIN — MORPHINE SULFATE 4 MG: 4 INJECTION INTRAVENOUS at 18:23

## 2019-07-26 RX ADMIN — ROCURONIUM BROMIDE 40 MG: 10 INJECTION, SOLUTION INTRAVENOUS at 09:55

## 2019-07-26 RX ADMIN — HYDROMORPHONE HYDROCHLORIDE 0.5 MG: 2 INJECTION INTRAMUSCULAR; INTRAVENOUS; SUBCUTANEOUS at 12:23

## 2019-07-26 RX ADMIN — SODIUM CHLORIDE, POTASSIUM CHLORIDE, SODIUM LACTATE AND CALCIUM CHLORIDE: 600; 310; 30; 20 INJECTION, SOLUTION INTRAVENOUS at 11:39

## 2019-07-26 RX ADMIN — MORPHINE SULFATE 4 MG: 4 INJECTION INTRAVENOUS at 08:45

## 2019-07-26 RX ADMIN — GLYCOPYRROLATE 0.2 MG: 0.2 INJECTION INTRAMUSCULAR; INTRAVENOUS at 09:55

## 2019-07-26 RX ADMIN — METRONIDAZOLE 500 MG: 500 INJECTION, SOLUTION INTRAVENOUS at 09:34

## 2019-07-26 RX ADMIN — CIPROFLOXACIN 400 MG: 2 INJECTION, SOLUTION INTRAVENOUS at 00:38

## 2019-07-26 RX ADMIN — MIDAZOLAM HYDROCHLORIDE 2 MG: 1 INJECTION, SOLUTION INTRAMUSCULAR; INTRAVENOUS at 09:33

## 2019-07-26 RX ADMIN — PHENYLEPHRINE HYDROCHLORIDE 100 MCG: 10 INJECTION INTRAVENOUS at 10:41

## 2019-07-26 RX ADMIN — METOPROLOL TARTRATE 2.5 MG: 1 INJECTION, SOLUTION INTRAVENOUS at 11:55

## 2019-07-26 RX ADMIN — PROPOFOL 20 MG: 10 INJECTION, EMULSION INTRAVENOUS at 12:17

## 2019-07-26 RX ADMIN — ACETAMINOPHEN 1000 MG: 10 INJECTION, SOLUTION INTRAVENOUS at 15:44

## 2019-07-26 RX ADMIN — MORPHINE SULFATE 4 MG: 4 INJECTION INTRAVENOUS at 04:26

## 2019-07-26 RX ADMIN — HYDROMORPHONE HYDROCHLORIDE 0.5 MG: 2 INJECTION INTRAMUSCULAR; INTRAVENOUS; SUBCUTANEOUS at 12:28

## 2019-07-26 RX ADMIN — PROPOFOL 70 MCG/KG/MIN: 10 INJECTION, EMULSION INTRAVENOUS at 19:03

## 2019-07-26 RX ADMIN — PROPOFOL 70 MCG/KG/MIN: 10 INJECTION, EMULSION INTRAVENOUS at 21:50

## 2019-07-26 RX ADMIN — HYDRALAZINE HYDROCHLORIDE 10 MG: 20 INJECTION INTRAMUSCULAR; INTRAVENOUS at 04:25

## 2019-07-26 RX ADMIN — Medication 5 MG: at 11:36

## 2019-07-26 RX ADMIN — ROCURONIUM BROMIDE 10 MG: 10 INJECTION, SOLUTION INTRAVENOUS at 10:59

## 2019-07-26 RX ADMIN — POTASSIUM CHLORIDE AND SODIUM CHLORIDE: 900; 300 INJECTION, SOLUTION INTRAVENOUS at 14:26

## 2019-07-26 RX ADMIN — MIDAZOLAM HYDROCHLORIDE 2 MG: 1 INJECTION INTRAMUSCULAR; INTRAVENOUS at 12:29

## 2019-07-26 RX ADMIN — POTASSIUM CHLORIDE: 149 INJECTION, SOLUTION, CONCENTRATE INTRAVENOUS at 20:23

## 2019-07-26 RX ADMIN — SUGAMMADEX 200 MG: 100 INJECTION, SOLUTION INTRAVENOUS at 11:47

## 2019-07-26 RX ADMIN — PROPOFOL 20 MG: 10 INJECTION, EMULSION INTRAVENOUS at 12:14

## 2019-07-26 RX ADMIN — HYDROMORPHONE HYDROCHLORIDE 0.5 MG: 2 INJECTION, SOLUTION INTRAMUSCULAR; INTRAVENOUS; SUBCUTANEOUS at 10:59

## 2019-07-26 RX ADMIN — METOPROLOL TARTRATE 2.5 MG: 1 INJECTION, SOLUTION INTRAVENOUS at 11:48

## 2019-07-26 RX ADMIN — PHENYLEPHRINE HYDROCHLORIDE 200 MCG: 10 INJECTION INTRAVENOUS at 10:06

## 2019-07-26 RX ADMIN — SODIUM CHLORIDE: 9 INJECTION, SOLUTION INTRAVENOUS at 10:07

## 2019-07-26 RX ADMIN — MIDAZOLAM 2 MG: 1 INJECTION INTRAMUSCULAR; INTRAVENOUS at 12:29

## 2019-07-26 RX ADMIN — PROPOFOL 50 MG: 10 INJECTION, EMULSION INTRAVENOUS at 11:56

## 2019-07-26 RX ADMIN — FENTANYL CITRATE 100 MCG: 50 INJECTION, SOLUTION INTRAMUSCULAR; INTRAVENOUS at 09:38

## 2019-07-26 RX ADMIN — ONDANSETRON 4 MG: 2 INJECTION INTRAMUSCULAR; INTRAVENOUS at 08:55

## 2019-07-26 RX ADMIN — ONDANSETRON 4 MG: 2 INJECTION INTRAMUSCULAR; INTRAVENOUS at 09:55

## 2019-07-26 RX ADMIN — PHENYLEPHRINE HYDROCHLORIDE 100 MCG: 10 INJECTION INTRAVENOUS at 10:00

## 2019-07-26 RX ADMIN — HYDROMORPHONE HYDROCHLORIDE 0.5 MG: 2 INJECTION INTRAMUSCULAR; INTRAVENOUS; SUBCUTANEOUS at 12:40

## 2019-07-26 RX ADMIN — PROPOFOL 25 MCG/KG/MIN: 10 INJECTION, EMULSION INTRAVENOUS at 12:30

## 2019-07-26 RX ADMIN — METOPROLOL TARTRATE 5 MG: 1 INJECTION, SOLUTION INTRAVENOUS at 00:32

## 2019-07-26 RX ADMIN — Medication 10 ML: at 20:24

## 2019-07-26 RX ADMIN — PHENYLEPHRINE HYDROCHLORIDE 100 MCG: 10 INJECTION INTRAVENOUS at 09:51

## 2019-07-26 RX ADMIN — SUCCINYLCHOLINE CHLORIDE 140 MG: 20 INJECTION, SOLUTION INTRAMUSCULAR; INTRAVENOUS at 09:41

## 2019-07-26 RX ADMIN — MAGNESIUM SULFATE HEPTAHYDRATE 1 G: 500 INJECTION, SOLUTION INTRAMUSCULAR; INTRAVENOUS at 10:13

## 2019-07-26 RX ADMIN — CIPROFLOXACIN 400 MG: 2 INJECTION, SOLUTION INTRAVENOUS at 14:32

## 2019-07-26 RX ADMIN — MORPHINE SULFATE 4 MG: 4 INJECTION INTRAVENOUS at 00:32

## 2019-07-26 RX ADMIN — PROPOFOL 20 MG: 10 INJECTION, EMULSION INTRAVENOUS at 12:08

## 2019-07-26 RX ADMIN — ACETAMINOPHEN 1000 MG: 10 INJECTION, SOLUTION INTRAVENOUS at 23:31

## 2019-07-26 RX ADMIN — GLYCOPYRROLATE 0.8 MG: 0.2 INJECTION INTRAMUSCULAR; INTRAVENOUS at 11:36

## 2019-07-26 RX ADMIN — DEXAMETHASONE SODIUM PHOSPHATE 8 MG: 4 INJECTION, SOLUTION INTRAMUSCULAR; INTRAVENOUS at 09:55

## 2019-07-26 RX ADMIN — PROPOFOL 180 MG: 10 INJECTION, EMULSION INTRAVENOUS at 09:41

## 2019-07-26 RX ADMIN — ACETAMINOPHEN 1000 MG: 10 INJECTION, SOLUTION INTRAVENOUS at 11:18

## 2019-07-26 RX ADMIN — LIDOCAINE HYDROCHLORIDE 100 MG: 20 INJECTION, SOLUTION EPIDURAL; INFILTRATION; INTRACAUDAL; PERINEURAL at 09:41

## 2019-07-26 ASSESSMENT — PULMONARY FUNCTION TESTS
PIF_VALUE: 22
PIF_VALUE: 23
PIF_VALUE: 19
PIF_VALUE: 20
PIF_VALUE: 19
PIF_VALUE: 1
PIF_VALUE: 20
PIF_VALUE: 39
PIF_VALUE: 19
PIF_VALUE: 19
PIF_VALUE: 1
PIF_VALUE: 19
PIF_VALUE: 19
PIF_VALUE: 20
PIF_VALUE: 19
PIF_VALUE: 23
PIF_VALUE: 1
PIF_VALUE: 0
PIF_VALUE: 0
PIF_VALUE: 22
PIF_VALUE: 5
PIF_VALUE: 26
PIF_VALUE: 22
PIF_VALUE: 19
PIF_VALUE: 22
PIF_VALUE: 19
PIF_VALUE: 19
PIF_VALUE: 18
PIF_VALUE: 27
PIF_VALUE: 49
PIF_VALUE: 18
PIF_VALUE: 20
PIF_VALUE: 26
PIF_VALUE: 2
PIF_VALUE: 18
PIF_VALUE: 1
PIF_VALUE: 19
PIF_VALUE: 20
PIF_VALUE: 3
PIF_VALUE: 19
PIF_VALUE: 21
PIF_VALUE: 1
PIF_VALUE: 19
PIF_VALUE: 20
PIF_VALUE: 23
PIF_VALUE: 18
PIF_VALUE: 19
PIF_VALUE: 24
PIF_VALUE: 0
PIF_VALUE: 18
PIF_VALUE: 23
PIF_VALUE: 26
PIF_VALUE: 19
PIF_VALUE: 19
PIF_VALUE: 20
PIF_VALUE: 18
PIF_VALUE: 27
PIF_VALUE: 22
PIF_VALUE: 3
PIF_VALUE: 1
PIF_VALUE: 1
PIF_VALUE: 20
PIF_VALUE: 0
PIF_VALUE: 20
PIF_VALUE: 19
PIF_VALUE: 20
PIF_VALUE: 3
PIF_VALUE: 1
PIF_VALUE: 1
PIF_VALUE: 19
PIF_VALUE: 21
PIF_VALUE: 19
PIF_VALUE: 1
PIF_VALUE: 20
PIF_VALUE: 0
PIF_VALUE: 19
PIF_VALUE: 31
PIF_VALUE: 19
PIF_VALUE: 20
PIF_VALUE: 19
PIF_VALUE: 1
PIF_VALUE: 20
PIF_VALUE: 20
PIF_VALUE: 19
PIF_VALUE: 4
PIF_VALUE: 35
PIF_VALUE: 18
PIF_VALUE: 19
PIF_VALUE: 19
PIF_VALUE: 21
PIF_VALUE: 18
PIF_VALUE: 20
PIF_VALUE: 0
PIF_VALUE: 20
PIF_VALUE: 1
PIF_VALUE: 23
PIF_VALUE: 19
PIF_VALUE: 18
PIF_VALUE: 19
PIF_VALUE: 19
PIF_VALUE: 0
PIF_VALUE: 0
PIF_VALUE: 1
PIF_VALUE: 19
PIF_VALUE: 1
PIF_VALUE: 20
PIF_VALUE: 19
PIF_VALUE: 19
PIF_VALUE: 22
PIF_VALUE: 19
PIF_VALUE: 19
PIF_VALUE: 20
PIF_VALUE: 0
PIF_VALUE: 22
PIF_VALUE: 19
PIF_VALUE: 26
PIF_VALUE: 2
PIF_VALUE: 20
PIF_VALUE: 1
PIF_VALUE: 19
PIF_VALUE: 1
PIF_VALUE: 19
PIF_VALUE: 20
PIF_VALUE: 19
PIF_VALUE: 24
PIF_VALUE: 23
PIF_VALUE: 0
PIF_VALUE: 19
PIF_VALUE: 1
PIF_VALUE: 20
PIF_VALUE: 23
PIF_VALUE: 2
PIF_VALUE: 1
PIF_VALUE: 19
PIF_VALUE: 23
PIF_VALUE: 20
PIF_VALUE: 19
PIF_VALUE: 20
PIF_VALUE: 1
PIF_VALUE: 3
PIF_VALUE: 20
PIF_VALUE: 18
PIF_VALUE: 20
PIF_VALUE: 1
PIF_VALUE: 20
PIF_VALUE: 23
PIF_VALUE: 20
PIF_VALUE: 23
PIF_VALUE: 19
PIF_VALUE: 17
PIF_VALUE: 55
PIF_VALUE: 19
PIF_VALUE: 22
PIF_VALUE: 19
PIF_VALUE: 18
PIF_VALUE: 22

## 2019-07-26 ASSESSMENT — PAIN SCALES - GENERAL
PAINLEVEL_OUTOF10: 6
PAINLEVEL_OUTOF10: 7
PAINLEVEL_OUTOF10: 4
PAINLEVEL_OUTOF10: 10
PAINLEVEL_OUTOF10: 7
PAINLEVEL_OUTOF10: 0
PAINLEVEL_OUTOF10: 0
PAINLEVEL_OUTOF10: 7
PAINLEVEL_OUTOF10: 5
PAINLEVEL_OUTOF10: 7
PAINLEVEL_OUTOF10: 3
PAINLEVEL_OUTOF10: 7
PAINLEVEL_OUTOF10: 0
PAINLEVEL_OUTOF10: 7

## 2019-07-26 ASSESSMENT — PAIN DESCRIPTION - LOCATION
LOCATION: ABDOMEN

## 2019-07-26 ASSESSMENT — PAIN DESCRIPTION - PAIN TYPE
TYPE: SURGICAL PAIN
TYPE: ACUTE PAIN
TYPE: SURGICAL PAIN

## 2019-07-26 ASSESSMENT — PAIN DESCRIPTION - ORIENTATION: ORIENTATION: MID

## 2019-07-26 NOTE — PROGRESS NOTES
[facility]    7/26/2019 2:29 PM        Name: Gita Clements .               Admitted: 7/24/2019  Primary Care Provider: Hebert Pruitt MD (Tel: [phone])      Subjective: Patient is currently postop sedated on ventilator status post EXPLORATORY LAPAROTOMY, LYSIS OF ADHESIONS, POSSIBLE BOWEL RESECTION         Reviewed interval ancillary notes    Current Medications    0.9 % sodium chloride infusion Continuous   lidocaine PF 1 % injection 1 mL Once PRN   0.9% NaCl with KCl 40 mEq infusion Continuous   acetaminophen (OFIRMEV) infusion 1,000 mg Once   naloxone (NARCAN) injection 0.4 mg PRN   HYDROmorphone (DILAUDID) 10 mg/50 mL PCA Continuous   acetaminophen (OFIRMEV) infusion 1,000 mg Q8H   propofol injection Titrated   fentaNYL (SUBLIMAZE) 1,000 mcg in sodium chloride 0.9 % 100 mL infusion Continuous   morphine injection 4 mg Q3H PRN   metoprolol (LOPRESSOR) injection 5 mg Q6H   prochlorperazine (COMPAZINE) injection 10 mg Q6H PRN   iohexol (OMNIPAQUE 240) injection 150 mL ONCE PRN   sodium chloride flush 0.9 % injection 10 mL 2 times per day   sodium chloride flush 0.9 % injection 10 mL PRN   magnesium hydroxide (MILK OF MAGNESIA) 400 MG/5ML suspension 30 mL Daily PRN   ondansetron (ZOFRAN) injection 4 mg Q6H PRN   enoxaparin (LOVENOX) injection 40 mg Daily   labetalol (NORMODYNE;TRANDATE) injection 10 mg Q6H PRN   ciprofloxacin (CIPRO) IVPB 400 mg Q12H   hydrALAZINE (APRESOLINE) injection 10 mg Q6H PRN   pantoprazole (PROTONIX) injection 40 mg Daily       Objective:  /66   Pulse 95   Temp 97 °F (36.1 °C) (Temporal)   Resp 12   Ht 5' 2.5\" (1.588 m)   Wt 210 lb (95.3 kg)   LMP 12/02/2010   SpO2 99%   BMI 37.80 kg/m²     Intake/Output Summary (Last 24 hours) at 7/26/2019 1429  Last data filed at 7/26/2019 1324  Gross per 24 hour   Intake 2000 ml   Output 1160 ml   Net 840 ml    Wt Readings from

## 2019-07-26 NOTE — PROGRESS NOTES
4 Eyes Skin Assessment     The patient is being assess for  Admission    I agree that 2 RN's have performed a thorough Head to Toe Skin Assessment on the patient. ALL assessment sites listed below have been assessed. Areas assessed by both nurses: Yes  [x]   Head, Face, and Ears   [x]   Shoulders, Back, and Chest  [x]   Arms, Elbows, and Hands   [x]   Coccyx, Sacrum, and IschIum  [x]   Legs, Feet, and Heels        Does the Patient have Skin Breakdown?  large old bruise to left knee         Lul Prevention initiated:  Yes   Wound Care Orders initiated:  NA      [facility] nurse consulted for Pressure Injury (Stage 3,4, Unstageable, DTI, NWPT, and Complex wounds), New and Established Ostomies:  No      Nurse 1 eSignature: Electronically signed by Javier Thacker RN on 7/26/19 at 6:51 PM    **SHARE this note so that the co-signing nurse is able to place an eSignature**    Nurse 2 eSignature: Electronically signed by Neyda Garcia RN on 7/26/19 at 7:02 PM           4 Eyes Skin Assessment     The patient is being assess for  Transfer to a unit     I agree that 2 RN's have performed a thorough Head to Toe Skin Assessment on the patient. ALL assessment sites listed below have been assessed. Areas assessed by both nurses:   []   Head, Face, and Ears   []   Shoulders, Back, and Chest  []   Arms, Elbows, and Hands   []   Coccyx, Sacrum, and IschIum  []   Legs, Feet, and Heels        Does the Patient have Skin Breakdown?   Ecchymosis on left knee      Lul Prevention initiated: yes  Wound Care Orders initiated:  yes      [facility] nurse consulted for Pressure Injury (Stage 3,4, Unstageable, DTI, NWPT, and Complex wounds), New and Established Ostomies: no     Nurse 1 eSignature: Electronically signed by Neyda Garcia RN on 7/26/19 at 7:04 PM    **SHARE this note so that the co-signing nurse is able to place an eSignature**    Nurse 2 eSignature: Electronically signed by Neyda Garcia RN on 7/26/19 at 7:04

## 2019-07-26 NOTE — PROGRESS NOTES
Pt is alert and oriented x4. Able to follow commands throughout assessment. Pt HR elevated and RR within normal limits. Respirations were even and easy with no adventitious sounds. Bowel sounds hypoactive x4. Pt demonstrated how to reach nurse with call light. Call light in reach. Will continue to monitor.

## 2019-07-26 NOTE — PROGRESS NOTES
Pt. admitted to ICU Bed #5 at 1350 from PACU. VSS, afebrile. Pt. RASS +1. Sedation off enroute. Sedation restarted. Bilateral wrist restraints appleid for pt. Safety. Pt. Nodded and gestured she is aware. Bed alarm intact and sav light in reach.

## 2019-07-26 NOTE — PLAN OF CARE
Problem: Falls - Risk of:  Goal: Will remain free from falls  Description  Will remain free from falls  Outcome: Ongoing  Note:   Fall precautions in place, call light within reach. Problem: Pain:  Goal: Pain level will decrease  Description  Pain level will decrease  Outcome: Ongoing  Note:   Prn pain meds available for pt.

## 2019-07-27 LAB
A/G RATIO: 1.2 (ref 1.1–2.2)
ALBUMIN SERPL-MCNC: 3.5 G/DL (ref 3.4–5)
ALP BLD-CCNC: 65 U/L (ref 40–129)
ALT SERPL-CCNC: 13 U/L (ref 10–40)
ANION GAP SERPL CALCULATED.3IONS-SCNC: 11 MMOL/L (ref 3–16)
AST SERPL-CCNC: 15 U/L (ref 15–37)
BASE EXCESS ARTERIAL: 2.4 MMOL/L (ref -3–3)
BILIRUB SERPL-MCNC: 0.8 MG/DL (ref 0–1)
BUN BLDV-MCNC: 8 MG/DL (ref 7–20)
CALCIUM SERPL-MCNC: 8.4 MG/DL (ref 8.3–10.6)
CARBOXYHEMOGLOBIN ARTERIAL: 1 % (ref 0–1.5)
CHLORIDE BLD-SCNC: 105 MMOL/L (ref 99–110)
CO2: 26 MMOL/L (ref 21–32)
CREAT SERPL-MCNC: 0.6 MG/DL (ref 0.6–1.1)
GFR AFRICAN AMERICAN: >60
GFR NON-AFRICAN AMERICAN: >60
GLOBULIN: 3 G/DL
GLUCOSE BLD-MCNC: 101 MG/DL (ref 70–99)
GLUCOSE BLD-MCNC: 105 MG/DL (ref 70–99)
GLUCOSE BLD-MCNC: 125 MG/DL (ref 70–99)
GLUCOSE BLD-MCNC: 143 MG/DL (ref 70–99)
GLUCOSE BLD-MCNC: 154 MG/DL (ref 70–99)
GLUCOSE BLD-MCNC: 159 MG/DL (ref 70–99)
GLUCOSE BLD-MCNC: 92 MG/DL (ref 70–99)
HCO3 ARTERIAL: 27.6 MMOL/L (ref 21–29)
HCT VFR BLD CALC: 30.1 % (ref 36–48)
HEMOGLOBIN, ART, EXTENDED: 9.8 G/DL (ref 12–16)
HEMOGLOBIN: 9.7 G/DL (ref 12–16)
MCH RBC QN AUTO: 26.5 PG (ref 26–34)
MCHC RBC AUTO-ENTMCNC: 32.2 G/DL (ref 31–36)
MCV RBC AUTO: 82.3 FL (ref 80–100)
METHEMOGLOBIN ARTERIAL: 0.4 %
O2 CONTENT ARTERIAL: 13 ML/DL
O2 SAT, ARTERIAL: 97 %
O2 THERAPY: ABNORMAL
PCO2 ARTERIAL: 44.4 MMHG (ref 35–45)
PDW BLD-RTO: 14.4 % (ref 12.4–15.4)
PERFORMED ON: ABNORMAL
PERFORMED ON: NORMAL
PH ARTERIAL: 7.4 (ref 7.35–7.45)
PLATELET # BLD: 250 K/UL (ref 135–450)
PMV BLD AUTO: 7.4 FL (ref 5–10.5)
PO2 ARTERIAL: 79.3 MMHG (ref 75–108)
POTASSIUM SERPL-SCNC: 3.6 MMOL/L (ref 3.5–5.1)
RBC # BLD: 3.66 M/UL (ref 4–5.2)
SODIUM BLD-SCNC: 142 MMOL/L (ref 136–145)
TCO2 ARTERIAL: 64.8 MMOL/L
TOTAL PROTEIN: 6.5 G/DL (ref 6.4–8.2)
WBC # BLD: 3.6 K/UL (ref 4–11)

## 2019-07-27 PROCEDURE — 2500000003 HC RX 250 WO HCPCS: Performed by: SURGERY

## 2019-07-27 PROCEDURE — 80053 COMPREHEN METABOLIC PANEL: CPT

## 2019-07-27 PROCEDURE — 6360000002 HC RX W HCPCS: Performed by: SURGERY

## 2019-07-27 PROCEDURE — 94750 HC PULMONARY COMPLIANCE STUDY: CPT

## 2019-07-27 PROCEDURE — 85027 COMPLETE CBC AUTOMATED: CPT

## 2019-07-27 PROCEDURE — 99233 SBSQ HOSP IP/OBS HIGH 50: CPT | Performed by: INTERNAL MEDICINE

## 2019-07-27 PROCEDURE — 2580000003 HC RX 258: Performed by: INTERNAL MEDICINE

## 2019-07-27 PROCEDURE — 2580000003 HC RX 258: Performed by: SURGERY

## 2019-07-27 PROCEDURE — 82803 BLOOD GASES ANY COMBINATION: CPT

## 2019-07-27 PROCEDURE — 99024 POSTOP FOLLOW-UP VISIT: CPT | Performed by: SURGERY

## 2019-07-27 PROCEDURE — 1200000000 HC SEMI PRIVATE

## 2019-07-27 PROCEDURE — 36415 COLL VENOUS BLD VENIPUNCTURE: CPT

## 2019-07-27 PROCEDURE — 6360000002 HC RX W HCPCS: Performed by: ANESTHESIOLOGY

## 2019-07-27 PROCEDURE — 2700000000 HC OXYGEN THERAPY PER DAY

## 2019-07-27 PROCEDURE — 94003 VENT MGMT INPAT SUBQ DAY: CPT

## 2019-07-27 PROCEDURE — 2580000003 HC RX 258: Performed by: ANESTHESIOLOGY

## 2019-07-27 PROCEDURE — 94770 HC ETCO2 MONITOR DAILY: CPT

## 2019-07-27 PROCEDURE — C9113 INJ PANTOPRAZOLE SODIUM, VIA: HCPCS | Performed by: SURGERY

## 2019-07-27 PROCEDURE — 94761 N-INVAS EAR/PLS OXIMETRY MLT: CPT

## 2019-07-27 PROCEDURE — 6370000000 HC RX 637 (ALT 250 FOR IP): Performed by: INTERNAL MEDICINE

## 2019-07-27 PROCEDURE — 6360000002 HC RX W HCPCS: Performed by: INTERNAL MEDICINE

## 2019-07-27 RX ORDER — METOPROLOL SUCCINATE 50 MG/1
50 TABLET, EXTENDED RELEASE ORAL DAILY
Status: DISCONTINUED | OUTPATIENT
Start: 2019-07-27 | End: 2019-07-29 | Stop reason: ALTCHOICE

## 2019-07-27 RX ADMIN — Medication 10 ML: at 08:08

## 2019-07-27 RX ADMIN — INSULIN LISPRO 1 UNITS: 100 INJECTION, SOLUTION INTRAVENOUS; SUBCUTANEOUS at 16:26

## 2019-07-27 RX ADMIN — METOPROLOL SUCCINATE 50 MG: 50 TABLET, FILM COATED, EXTENDED RELEASE ORAL at 10:16

## 2019-07-27 RX ADMIN — POTASSIUM CHLORIDE: 149 INJECTION, SOLUTION, CONCENTRATE INTRAVENOUS at 23:29

## 2019-07-27 RX ADMIN — PROPOFOL 70 MCG/KG/MIN: 10 INJECTION, EMULSION INTRAVENOUS at 00:30

## 2019-07-27 RX ADMIN — POTASSIUM CHLORIDE: 149 INJECTION, SOLUTION, CONCENTRATE INTRAVENOUS at 15:20

## 2019-07-27 RX ADMIN — CIPROFLOXACIN 400 MG: 2 INJECTION, SOLUTION INTRAVENOUS at 14:33

## 2019-07-27 RX ADMIN — ACETAMINOPHEN 1000 MG: 10 INJECTION, SOLUTION INTRAVENOUS at 06:27

## 2019-07-27 RX ADMIN — POTASSIUM CHLORIDE: 149 INJECTION, SOLUTION, CONCENTRATE INTRAVENOUS at 05:02

## 2019-07-27 RX ADMIN — ENOXAPARIN SODIUM 40 MG: 40 INJECTION SUBCUTANEOUS at 08:31

## 2019-07-27 RX ADMIN — Medication 10 ML: at 20:24

## 2019-07-27 RX ADMIN — ACETAMINOPHEN 1000 MG: 10 INJECTION, SOLUTION INTRAVENOUS at 23:07

## 2019-07-27 RX ADMIN — HYDROMORPHONE HYDROCHLORIDE 10 MG: 10 INJECTION, SOLUTION INTRAMUSCULAR; INTRAVENOUS; SUBCUTANEOUS at 08:48

## 2019-07-27 RX ADMIN — HYDROMORPHONE HYDROCHLORIDE: 10 INJECTION, SOLUTION INTRAMUSCULAR; INTRAVENOUS; SUBCUTANEOUS at 17:53

## 2019-07-27 RX ADMIN — PANTOPRAZOLE SODIUM 40 MG: 40 INJECTION, POWDER, FOR SOLUTION INTRAVENOUS at 08:08

## 2019-07-27 RX ADMIN — FENTANYL CITRATE 1 MCG/KG/HR: 50 INJECTION INTRAVENOUS at 00:15

## 2019-07-27 RX ADMIN — PROPOFOL 70 MCG/KG/MIN: 10 INJECTION, EMULSION INTRAVENOUS at 03:21

## 2019-07-27 RX ADMIN — ACETAMINOPHEN 1000 MG: 10 INJECTION, SOLUTION INTRAVENOUS at 15:20

## 2019-07-27 RX ADMIN — METOPROLOL TARTRATE 5 MG: 1 INJECTION, SOLUTION INTRAVENOUS at 01:39

## 2019-07-27 RX ADMIN — PROPOFOL 100 MCG/KG/MIN: 10 INJECTION, EMULSION INTRAVENOUS at 07:18

## 2019-07-27 RX ADMIN — CIPROFLOXACIN 400 MG: 2 INJECTION, SOLUTION INTRAVENOUS at 01:39

## 2019-07-27 RX ADMIN — METOPROLOL TARTRATE 5 MG: 1 INJECTION, SOLUTION INTRAVENOUS at 06:27

## 2019-07-27 RX ADMIN — PROPOFOL 80 MCG/KG/MIN: 10 INJECTION, EMULSION INTRAVENOUS at 05:05

## 2019-07-27 ASSESSMENT — PAIN SCALES - GENERAL
PAINLEVEL_OUTOF10: 7
PAINLEVEL_OUTOF10: 5
PAINLEVEL_OUTOF10: 0
PAINLEVEL_OUTOF10: 0
PAINLEVEL_OUTOF10: 3
PAINLEVEL_OUTOF10: 2
PAINLEVEL_OUTOF10: 7
PAINLEVEL_OUTOF10: 5
PAINLEVEL_OUTOF10: 7

## 2019-07-27 ASSESSMENT — PULMONARY FUNCTION TESTS
PIF_VALUE: 16
PIF_VALUE: 32
PIF_VALUE: 25
PIF_VALUE: 16

## 2019-07-27 ASSESSMENT — PAIN DESCRIPTION - PAIN TYPE
TYPE: SURGICAL PAIN
TYPE: ACUTE PAIN;SURGICAL PAIN

## 2019-07-27 ASSESSMENT — PAIN DESCRIPTION - ORIENTATION
ORIENTATION: MID
ORIENTATION: MID

## 2019-07-27 ASSESSMENT — PAIN DESCRIPTION - LOCATION
LOCATION: ABDOMEN
LOCATION: ABDOMEN

## 2019-07-27 NOTE — PROGRESS NOTES
07/26/19 2049   Vent Patient Data   Plateau Pressure 21 [id number]   Static Compliance 40.92 mL/cmH2O   Dynamic Compliance 28.7 mL/cmH2O

## 2019-07-27 NOTE — OP NOTE
[facility]                     [facility], [address]                                OPERATIVE REPORT    PATIENT NAME: Alex Hernandez                 :        1970  MED REC NO:   [id number]                          ROOM:       [id number]  ACCOUNT NO:   [de-identified]                           ADMIT DATE: 2019  PROVIDER:     Shantal Meyers MD    DATE OF PROCEDURE:  2019    PREOPERATIVE DIAGNOSIS:  Small bowel obstruction. POSTOPERATIVE DIAGNOSIS:  Obstruction at ileocecal valve. PROCEDURE:  Exploratory laparotomy with ileocecectomy. SURGEON:  Shantal Meyers MD    ANESTHESIA:  General endotracheal.    ESTIMATED BLOOD LOSS:  50 mL. COMPLICATIONS:  None. SPECIMEN:  Terminal ileum and right colon. OPERATIVE INDICATIONS:  The patient is a 60-year-old female who  presented to the hospital two days ago with abdominal pain, nausea, and  vomiting. CAT scan showed findings consistent with a small bowel  obstruction. Her past surgical history is significant for an  exploratory laparotomy eight years ago for a small bowel obstruction. There has been a questionable diagnosis of Crohn's disease, although the  patient is not on treatment. She is given a trial of conservative  management. Small bowel follow-through showed only very minimal  progression to the cecum. She is brought to the operating room for  exploratory laparotomy with lysis of adhesions and possible bowel  resection. She was explained the risks, benefits, and possible  complications including risk of anastomotic leakage. DETAILS OF THE PROCEDURE:  The patient was brought in the operative  suite and placed in a supine position on the operative table. After  general endotracheal anesthesia, she was prepped and draped in usual  sterile fashion. We made a midline incision surrounding the umbilicus.    The incision was extended superiorly and inferiorly during

## 2019-07-27 NOTE — PROGRESS NOTES
VSS see flow sheet. Reassessment complete. No acute changes. ABG drawn per order/protocol. Patient tolerated well. . Propofol and fentanyl continue infusing. Patient continues to be alert. Agitated and restless at times. Moving frequently in bed. Will monitor. Bed alarm engaged.

## 2019-07-27 NOTE — PROGRESS NOTES
[facility] PROGRESS NOTE    7/27/2019 11:19 AM        Name: Reyes Linares .               Admitted: 7/24/2019  Primary Care Provider: Aaron Ascencio MD (Tel: [phone])      Subjective: Patient is currently postop day 1 status post EXPLORATORY LAPAROTOMY, LYSIS OF ADHESIONS, POSSIBLE BOWEL RESECTION currently extubated has normal mental status        Reviewed interval ancillary notes    Current Medications    metoprolol succinate (TOPROL XL) extended release tablet 50 mg Daily   acetaminophen (OFIRMEV) infusion 1,000 mg Once   naloxone (NARCAN) injection 0.4 mg PRN   HYDROmorphone (DILAUDID) 10 mg/50 mL PCA Continuous   acetaminophen (OFIRMEV) infusion 1,000 mg Q8H   propofol injection Titrated   fentaNYL (SUBLIMAZE) 1,000 mcg in sodium chloride 0.9 % 100 mL infusion Continuous   insulin lispro (HUMALOG) injection pen 0-6 Units TID WC   insulin lispro (HUMALOG) injection pen 0-3 Units Nightly   glucose (GLUTOSE) 40 % oral gel 15 g PRN   dextrose 50 % IV solution PRN   glucagon (rDNA) injection 1 mg PRN   dextrose 5 % solution PRN   potassium chloride 20 mEq in sodium chloride 0.9 % 1,000 mL infusion Continuous   morphine injection 4 mg Q3H PRN   prochlorperazine (COMPAZINE) injection 10 mg Q6H PRN   iohexol (OMNIPAQUE 240) injection 150 mL ONCE PRN   sodium chloride flush 0.9 % injection 10 mL 2 times per day   sodium chloride flush 0.9 % injection 10 mL PRN   magnesium hydroxide (MILK OF MAGNESIA) 400 MG/5ML suspension 30 mL Daily PRN   ondansetron (ZOFRAN) injection 4 mg Q6H PRN   enoxaparin (LOVENOX) injection 40 mg Daily   labetalol (NORMODYNE;TRANDATE) injection 10 mg Q6H PRN   ciprofloxacin (CIPRO) IVPB 400 mg Q12H   hydrALAZINE (APRESOLINE) injection 10 mg Q6H PRN   pantoprazole (PROTONIX) injection 40 mg Daily       Objective:  BP (!) 154/84   Pulse 120   Temp 98.6 °F (37 °C) (Temporal)   Resp 19   Ht 5'

## 2019-07-27 NOTE — PROGRESS NOTES
Pt. Extubated at 0930. Pt. , BP elevated. Pt. Takes multiple BP meds to control BP. Bed alarm intact and call light in reach.

## 2019-07-27 NOTE — PROGRESS NOTES
Patient restless. Moving frequently in bed. Shakes head \"Y\" that she is having pain. Assisted patient with repositioning. Visitors leaving. Increased propofol for comfort. See EMAR. Will monitor.

## 2019-07-27 NOTE — PROGRESS NOTES
Patient has been successfully weaned from Mechanical Ventilation. RSBI before extubation was 20 with EtCO2 of 41 with positive leak test with audible leak present and SpO2 of 100 on 35% FiO2. Patient extubated and placed on room air. Post extubation SpO2 is 92% with HR  111 bpm and RR 16 breaths/min. Patient had mild cough that was non-productive. Extubation Well tolerated by patient. Musa Arteaga

## 2019-07-28 LAB
A/G RATIO: 1 (ref 1.1–2.2)
ALBUMIN SERPL-MCNC: 3.6 G/DL (ref 3.4–5)
ALP BLD-CCNC: 94 U/L (ref 40–129)
ALT SERPL-CCNC: 15 U/L (ref 10–40)
ANION GAP SERPL CALCULATED.3IONS-SCNC: 11 MMOL/L (ref 3–16)
AST SERPL-CCNC: 19 U/L (ref 15–37)
BILIRUB SERPL-MCNC: 0.6 MG/DL (ref 0–1)
BUN BLDV-MCNC: 7 MG/DL (ref 7–20)
CALCIUM SERPL-MCNC: 8.6 MG/DL (ref 8.3–10.6)
CHLORIDE BLD-SCNC: 103 MMOL/L (ref 99–110)
CO2: 28 MMOL/L (ref 21–32)
CREAT SERPL-MCNC: <0.5 MG/DL (ref 0.6–1.1)
GFR AFRICAN AMERICAN: >60
GFR NON-AFRICAN AMERICAN: >60
GLOBULIN: 3.5 G/DL
GLUCOSE BLD-MCNC: 108 MG/DL (ref 70–99)
GLUCOSE BLD-MCNC: 111 MG/DL (ref 70–99)
GLUCOSE BLD-MCNC: 113 MG/DL (ref 70–99)
GLUCOSE BLD-MCNC: 138 MG/DL (ref 70–99)
GLUCOSE BLD-MCNC: 98 MG/DL (ref 70–99)
HCT VFR BLD CALC: 28.2 % (ref 36–48)
HEMOGLOBIN: 9.3 G/DL (ref 12–16)
MCH RBC QN AUTO: 26.8 PG (ref 26–34)
MCHC RBC AUTO-ENTMCNC: 33 G/DL (ref 31–36)
MCV RBC AUTO: 81.4 FL (ref 80–100)
PDW BLD-RTO: 14.2 % (ref 12.4–15.4)
PERFORMED ON: ABNORMAL
PERFORMED ON: NORMAL
PLATELET # BLD: 253 K/UL (ref 135–450)
PMV BLD AUTO: 6.7 FL (ref 5–10.5)
POTASSIUM SERPL-SCNC: 4 MMOL/L (ref 3.5–5.1)
RBC # BLD: 3.46 M/UL (ref 4–5.2)
SODIUM BLD-SCNC: 142 MMOL/L (ref 136–145)
TOTAL PROTEIN: 7.1 G/DL (ref 6.4–8.2)
WBC # BLD: 3.8 K/UL (ref 4–11)

## 2019-07-28 PROCEDURE — 6360000002 HC RX W HCPCS: Performed by: INTERNAL MEDICINE

## 2019-07-28 PROCEDURE — 6360000002 HC RX W HCPCS: Performed by: SURGERY

## 2019-07-28 PROCEDURE — 6370000000 HC RX 637 (ALT 250 FOR IP): Performed by: INTERNAL MEDICINE

## 2019-07-28 PROCEDURE — 2700000000 HC OXYGEN THERAPY PER DAY

## 2019-07-28 PROCEDURE — 2580000003 HC RX 258: Performed by: INTERNAL MEDICINE

## 2019-07-28 PROCEDURE — 80053 COMPREHEN METABOLIC PANEL: CPT

## 2019-07-28 PROCEDURE — 36415 COLL VENOUS BLD VENIPUNCTURE: CPT

## 2019-07-28 PROCEDURE — 94761 N-INVAS EAR/PLS OXIMETRY MLT: CPT

## 2019-07-28 PROCEDURE — 85027 COMPLETE CBC AUTOMATED: CPT

## 2019-07-28 PROCEDURE — 2580000003 HC RX 258: Performed by: SURGERY

## 2019-07-28 PROCEDURE — 1200000000 HC SEMI PRIVATE

## 2019-07-28 PROCEDURE — 94770 HC ETCO2 MONITOR DAILY: CPT

## 2019-07-28 PROCEDURE — C9113 INJ PANTOPRAZOLE SODIUM, VIA: HCPCS | Performed by: SURGERY

## 2019-07-28 PROCEDURE — 99024 POSTOP FOLLOW-UP VISIT: CPT | Performed by: SURGERY

## 2019-07-28 RX ORDER — METOCLOPRAMIDE HYDROCHLORIDE 5 MG/ML
10 INJECTION INTRAMUSCULAR; INTRAVENOUS EVERY 6 HOURS
Status: COMPLETED | OUTPATIENT
Start: 2019-07-28 | End: 2019-07-29

## 2019-07-28 RX ORDER — SODIUM CHLORIDE AND POTASSIUM CHLORIDE .9; .15 G/100ML; G/100ML
SOLUTION INTRAVENOUS CONTINUOUS
Status: DISCONTINUED | OUTPATIENT
Start: 2019-07-28 | End: 2019-08-01

## 2019-07-28 RX ORDER — KETOROLAC TROMETHAMINE 30 MG/ML
30 INJECTION, SOLUTION INTRAMUSCULAR; INTRAVENOUS EVERY 6 HOURS PRN
Status: DISPENSED | OUTPATIENT
Start: 2019-07-28 | End: 2019-08-02

## 2019-07-28 RX ORDER — LISINOPRIL 20 MG/1
20 TABLET ORAL DAILY
Status: DISCONTINUED | OUTPATIENT
Start: 2019-07-28 | End: 2019-07-29

## 2019-07-28 RX ORDER — METOPROLOL TARTRATE 5 MG/5ML
5 INJECTION INTRAVENOUS EVERY 6 HOURS PRN
Status: DISCONTINUED | OUTPATIENT
Start: 2019-07-28 | End: 2019-08-02 | Stop reason: HOSPADM

## 2019-07-28 RX ORDER — METOCLOPRAMIDE HYDROCHLORIDE 5 MG/ML
10 INJECTION INTRAMUSCULAR; INTRAVENOUS EVERY 6 HOURS
Status: DISCONTINUED | OUTPATIENT
Start: 2019-07-28 | End: 2019-07-28

## 2019-07-28 RX ADMIN — ACETAMINOPHEN 1000 MG: 10 INJECTION, SOLUTION INTRAVENOUS at 23:03

## 2019-07-28 RX ADMIN — HYDRALAZINE HYDROCHLORIDE 10 MG: 20 INJECTION INTRAMUSCULAR; INTRAVENOUS at 14:29

## 2019-07-28 RX ADMIN — Medication 10 ML: at 09:44

## 2019-07-28 RX ADMIN — METOCLOPRAMIDE 10 MG: 5 INJECTION, SOLUTION INTRAMUSCULAR; INTRAVENOUS at 14:40

## 2019-07-28 RX ADMIN — ACETAMINOPHEN 1000 MG: 10 INJECTION, SOLUTION INTRAVENOUS at 17:51

## 2019-07-28 RX ADMIN — KETOROLAC TROMETHAMINE 30 MG: 30 INJECTION, SOLUTION INTRAMUSCULAR; INTRAVENOUS at 14:40

## 2019-07-28 RX ADMIN — POTASSIUM CHLORIDE AND SODIUM CHLORIDE: 900; 150 INJECTION, SOLUTION INTRAVENOUS at 17:53

## 2019-07-28 RX ADMIN — PANTOPRAZOLE SODIUM 40 MG: 40 INJECTION, POWDER, FOR SOLUTION INTRAVENOUS at 09:44

## 2019-07-28 RX ADMIN — METOPROLOL SUCCINATE 50 MG: 50 TABLET, FILM COATED, EXTENDED RELEASE ORAL at 09:44

## 2019-07-28 RX ADMIN — POTASSIUM CHLORIDE: 149 INJECTION, SOLUTION, CONCENTRATE INTRAVENOUS at 08:47

## 2019-07-28 RX ADMIN — CIPROFLOXACIN 400 MG: 2 INJECTION, SOLUTION INTRAVENOUS at 14:27

## 2019-07-28 RX ADMIN — Medication 10 ML: at 20:16

## 2019-07-28 RX ADMIN — LISINOPRIL 20 MG: 20 TABLET ORAL at 09:44

## 2019-07-28 RX ADMIN — METOCLOPRAMIDE 10 MG: 5 INJECTION, SOLUTION INTRAMUSCULAR; INTRAVENOUS at 20:16

## 2019-07-28 RX ADMIN — ENOXAPARIN SODIUM 40 MG: 40 INJECTION SUBCUTANEOUS at 09:44

## 2019-07-28 RX ADMIN — HYDROMORPHONE HYDROCHLORIDE: 10 INJECTION, SOLUTION INTRAMUSCULAR; INTRAVENOUS; SUBCUTANEOUS at 06:58

## 2019-07-28 RX ADMIN — ACETAMINOPHEN 1000 MG: 10 INJECTION, SOLUTION INTRAVENOUS at 06:30

## 2019-07-28 RX ADMIN — HYDROMORPHONE HYDROCHLORIDE 10 MG: 10 INJECTION, SOLUTION INTRAMUSCULAR; INTRAVENOUS; SUBCUTANEOUS at 23:24

## 2019-07-28 RX ADMIN — CIPROFLOXACIN 400 MG: 2 INJECTION, SOLUTION INTRAVENOUS at 02:33

## 2019-07-28 ASSESSMENT — PAIN SCALES - GENERAL
PAINLEVEL_OUTOF10: 8
PAINLEVEL_OUTOF10: 7
PAINLEVEL_OUTOF10: 8
PAINLEVEL_OUTOF10: 7
PAINLEVEL_OUTOF10: 8
PAINLEVEL_OUTOF10: 7
PAINLEVEL_OUTOF10: 2
PAINLEVEL_OUTOF10: 7
PAINLEVEL_OUTOF10: 8

## 2019-07-28 ASSESSMENT — PAIN DESCRIPTION - ORIENTATION: ORIENTATION: MID

## 2019-07-28 ASSESSMENT — PAIN DESCRIPTION - LOCATION: LOCATION: ABDOMEN

## 2019-07-28 ASSESSMENT — PAIN DESCRIPTION - PAIN TYPE: TYPE: SURGICAL PAIN

## 2019-07-28 NOTE — PROGRESS NOTES
Shift assessment complete. VSS. See flowsheet for more information. Patient A&Ox4. States pain of 7 in abdomen. Dilaudid PCA infusing. Re-educated on bolus PCA dosing. Verbalizes understanding. Surgical dressings CDI. Abdomen soft and tender. Bowel sounds hypoactive. Lungs clear. Pulses palpable. POC discussed. Verbalizes understanding. Jackie Ramos.  Parisa WILLIAMSON, RN, CCRN, CNL

## 2019-07-28 NOTE — PROGRESS NOTES
[facility]    Surgery Progress Note           POD # 2    PATIENT NAME: Joshua Prieto     TODAY'S DATE: 7/28/2019    SUBJECTIVE:    Pt in bed, fair pain control, feels nauseated, no flatus or BM  Using PCA  Hypertensive     OBJECTIVE:   VITALS:  BP (!) 160/86   Pulse 94   Temp 97.8 °F (36.6 °C) (Temporal)   Resp 14   Ht 5' 2.5\" (1.588 m)   Wt 222 lb 3.6 oz (100.8 kg)   LMP 12/02/2010   SpO2 95%   BMI 40.00 kg/m²     INTAKE/OUTPUT:    I/O last 3 completed shifts: In: 4382.3 [P.O.:350; I.V.:3432.3; Other:200; IV Piggyback:400]  Out: 2250 [Urine:2250]  No intake/output data recorded. CONSTITUTIONAL:  awake and alert  LUNGS:  Good BS  ABDOMEN:   hypoactive bowel sounds, firm, distended, mild tenderness noted diffusely   INCISION: dressing changed, incision is clean, dry    Data:  CBC:   Recent Labs     07/26/19  1401 07/27/19  1123 07/28/19  0949   WBC 2.3* 3.6* 3.8*   HGB 11.2* 9.7* 9.3*   HCT 34.5* 30.1* 28.2*    250 253     BMP:    Recent Labs     07/26/19  1401 07/27/19  1123 07/28/19  0949    142 142   K 3.8 3.6 4.0    105 103   CO2 25 26 28   BUN 9 8 7   CREATININE 0.6 0.6 <0.5*   GLUCOSE 154* 154* 138*     Hepatic:   Recent Labs     07/26/19  1401 07/27/19  1123 07/28/19  0949   AST 20 15 19   ALT 17 13 15   BILITOT 0.4 0.8 0.6   ALKPHOS 59 65 94     Mag:    No results for input(s): MG in the last 72 hours. Phos:   No results for input(s): PHOS in the last 72 hours. INR: No results for input(s): INR in the last 72 hours.     Radiology Review:  No new abd studies    ASSESSMENT AND PLAN:  53 y.o. female status post ex lap, YOHAN, ileocecectomy    Cont NG, IVF, PCA today, addn IV Tylenol and Toradol for pain   Add Reglan X 24 hours  Addn antihypertensive med  Dressing changed  Reviewed with pt and nursing    Jama Yee

## 2019-07-29 LAB
ANION GAP SERPL CALCULATED.3IONS-SCNC: 11 MMOL/L (ref 3–16)
BASOPHILS ABSOLUTE: 0 K/UL (ref 0–0.2)
BASOPHILS RELATIVE PERCENT: 0.2 %
BUN BLDV-MCNC: 7 MG/DL (ref 7–20)
CALCIUM SERPL-MCNC: 9 MG/DL (ref 8.3–10.6)
CHLORIDE BLD-SCNC: 102 MMOL/L (ref 99–110)
CO2: 25 MMOL/L (ref 21–32)
CREAT SERPL-MCNC: <0.5 MG/DL (ref 0.6–1.1)
EOSINOPHILS ABSOLUTE: 0.1 K/UL (ref 0–0.6)
EOSINOPHILS RELATIVE PERCENT: 2.8 %
GFR AFRICAN AMERICAN: >60
GFR NON-AFRICAN AMERICAN: >60
GLUCOSE BLD-MCNC: 104 MG/DL (ref 70–99)
GLUCOSE BLD-MCNC: 112 MG/DL (ref 70–99)
GLUCOSE BLD-MCNC: 113 MG/DL (ref 70–99)
GLUCOSE BLD-MCNC: 116 MG/DL (ref 70–99)
GLUCOSE BLD-MCNC: 126 MG/DL (ref 70–99)
GLUCOSE BLD-MCNC: 134 MG/DL (ref 70–99)
HCT VFR BLD CALC: 28.6 % (ref 36–48)
HEMOGLOBIN: 9.2 G/DL (ref 12–16)
LYMPHOCYTES ABSOLUTE: 0.6 K/UL (ref 1–5.1)
LYMPHOCYTES RELATIVE PERCENT: 15.2 %
MCH RBC QN AUTO: 26.6 PG (ref 26–34)
MCHC RBC AUTO-ENTMCNC: 32.2 G/DL (ref 31–36)
MCV RBC AUTO: 82.7 FL (ref 80–100)
MONOCYTES ABSOLUTE: 0.3 K/UL (ref 0–1.3)
MONOCYTES RELATIVE PERCENT: 7.9 %
NEUTROPHILS ABSOLUTE: 3 K/UL (ref 1.7–7.7)
NEUTROPHILS RELATIVE PERCENT: 73.9 %
PDW BLD-RTO: 14.2 % (ref 12.4–15.4)
PERFORMED ON: ABNORMAL
PLATELET # BLD: 247 K/UL (ref 135–450)
PMV BLD AUTO: 7.2 FL (ref 5–10.5)
POTASSIUM SERPL-SCNC: 3.6 MMOL/L (ref 3.5–5.1)
RBC # BLD: 3.45 M/UL (ref 4–5.2)
SODIUM BLD-SCNC: 138 MMOL/L (ref 136–145)
WBC # BLD: 4 K/UL (ref 4–11)

## 2019-07-29 PROCEDURE — 2580000003 HC RX 258: Performed by: SURGERY

## 2019-07-29 PROCEDURE — 6360000002 HC RX W HCPCS: Performed by: SURGERY

## 2019-07-29 PROCEDURE — 2700000000 HC OXYGEN THERAPY PER DAY

## 2019-07-29 PROCEDURE — 6370000000 HC RX 637 (ALT 250 FOR IP): Performed by: INTERNAL MEDICINE

## 2019-07-29 PROCEDURE — 80048 BASIC METABOLIC PNL TOTAL CA: CPT

## 2019-07-29 PROCEDURE — C9113 INJ PANTOPRAZOLE SODIUM, VIA: HCPCS | Performed by: SURGERY

## 2019-07-29 PROCEDURE — 2500000003 HC RX 250 WO HCPCS: Performed by: SURGERY

## 2019-07-29 PROCEDURE — 6360000002 HC RX W HCPCS: Performed by: INTERNAL MEDICINE

## 2019-07-29 PROCEDURE — 94770 HC ETCO2 MONITOR DAILY: CPT

## 2019-07-29 PROCEDURE — 94761 N-INVAS EAR/PLS OXIMETRY MLT: CPT

## 2019-07-29 PROCEDURE — 2500000003 HC RX 250 WO HCPCS: Performed by: INTERNAL MEDICINE

## 2019-07-29 PROCEDURE — 85025 COMPLETE CBC W/AUTO DIFF WBC: CPT

## 2019-07-29 PROCEDURE — 36415 COLL VENOUS BLD VENIPUNCTURE: CPT

## 2019-07-29 PROCEDURE — 1200000000 HC SEMI PRIVATE

## 2019-07-29 PROCEDURE — 99024 POSTOP FOLLOW-UP VISIT: CPT | Performed by: SURGERY

## 2019-07-29 RX ORDER — LISINOPRIL 20 MG/1
40 TABLET ORAL DAILY
Status: DISCONTINUED | OUTPATIENT
Start: 2019-07-30 | End: 2019-08-02 | Stop reason: HOSPADM

## 2019-07-29 RX ORDER — HYDROMORPHONE HYDROCHLORIDE 1 MG/ML
0.5 INJECTION, SOLUTION INTRAMUSCULAR; INTRAVENOUS; SUBCUTANEOUS
Status: DISCONTINUED | OUTPATIENT
Start: 2019-07-29 | End: 2019-08-02 | Stop reason: HOSPADM

## 2019-07-29 RX ORDER — METOPROLOL SUCCINATE 50 MG/1
100 TABLET, EXTENDED RELEASE ORAL DAILY
Status: DISCONTINUED | OUTPATIENT
Start: 2019-07-29 | End: 2019-07-29

## 2019-07-29 RX ORDER — METOCLOPRAMIDE HYDROCHLORIDE 5 MG/ML
10 INJECTION INTRAMUSCULAR; INTRAVENOUS EVERY 6 HOURS
Status: DISCONTINUED | OUTPATIENT
Start: 2019-07-29 | End: 2019-08-02 | Stop reason: HOSPADM

## 2019-07-29 RX ORDER — METOPROLOL SUCCINATE 50 MG/1
50 TABLET, EXTENDED RELEASE ORAL ONCE
Status: COMPLETED | OUTPATIENT
Start: 2019-07-29 | End: 2019-07-29

## 2019-07-29 RX ORDER — METOPROLOL SUCCINATE 50 MG/1
100 TABLET, EXTENDED RELEASE ORAL DAILY
Status: DISCONTINUED | OUTPATIENT
Start: 2019-07-30 | End: 2019-08-01

## 2019-07-29 RX ORDER — FUROSEMIDE 20 MG/1
20 TABLET ORAL DAILY
Status: DISCONTINUED | OUTPATIENT
Start: 2019-07-30 | End: 2019-08-01

## 2019-07-29 RX ORDER — ACETAMINOPHEN 10 MG/ML
1000 INJECTION, SOLUTION INTRAVENOUS ONCE
Status: COMPLETED | OUTPATIENT
Start: 2019-07-29 | End: 2019-07-29

## 2019-07-29 RX ORDER — LISINOPRIL 20 MG/1
20 TABLET ORAL ONCE
Status: COMPLETED | OUTPATIENT
Start: 2019-07-29 | End: 2019-07-29

## 2019-07-29 RX ORDER — HYDRALAZINE HYDROCHLORIDE 20 MG/ML
20 INJECTION INTRAMUSCULAR; INTRAVENOUS EVERY 6 HOURS PRN
Status: DISCONTINUED | OUTPATIENT
Start: 2019-07-29 | End: 2019-08-02 | Stop reason: HOSPADM

## 2019-07-29 RX ADMIN — METOCLOPRAMIDE 10 MG: 5 INJECTION, SOLUTION INTRAMUSCULAR; INTRAVENOUS at 08:46

## 2019-07-29 RX ADMIN — PANTOPRAZOLE SODIUM 40 MG: 40 INJECTION, POWDER, FOR SOLUTION INTRAVENOUS at 08:46

## 2019-07-29 RX ADMIN — ACETAMINOPHEN 1000 MG: 10 INJECTION, SOLUTION INTRAVENOUS at 07:14

## 2019-07-29 RX ADMIN — METRONIDAZOLE 500 MG: 500 INJECTION, SOLUTION INTRAVENOUS at 18:00

## 2019-07-29 RX ADMIN — HYDRALAZINE HYDROCHLORIDE 20 MG: 20 INJECTION INTRAMUSCULAR; INTRAVENOUS at 18:06

## 2019-07-29 RX ADMIN — ACETAMINOPHEN 1000 MG: 10 INJECTION, SOLUTION INTRAVENOUS at 20:11

## 2019-07-29 RX ADMIN — POTASSIUM CHLORIDE AND SODIUM CHLORIDE: 900; 150 INJECTION, SOLUTION INTRAVENOUS at 01:58

## 2019-07-29 RX ADMIN — CIPROFLOXACIN 400 MG: 2 INJECTION, SOLUTION INTRAVENOUS at 15:21

## 2019-07-29 RX ADMIN — HYDROMORPHONE HYDROCHLORIDE 0.5 MG: 1 INJECTION, SOLUTION INTRAMUSCULAR; INTRAVENOUS; SUBCUTANEOUS at 22:58

## 2019-07-29 RX ADMIN — LABETALOL HYDROCHLORIDE 10 MG: 5 INJECTION, SOLUTION INTRAVENOUS at 12:03

## 2019-07-29 RX ADMIN — HYDROMORPHONE HYDROCHLORIDE 0.5 MG: 1 INJECTION, SOLUTION INTRAMUSCULAR; INTRAVENOUS; SUBCUTANEOUS at 20:12

## 2019-07-29 RX ADMIN — METOPROLOL SUCCINATE 50 MG: 50 TABLET, FILM COATED, EXTENDED RELEASE ORAL at 08:46

## 2019-07-29 RX ADMIN — HYDROMORPHONE HYDROCHLORIDE 10 MG: 10 INJECTION, SOLUTION INTRAMUSCULAR; INTRAVENOUS; SUBCUTANEOUS at 16:22

## 2019-07-29 RX ADMIN — ENOXAPARIN SODIUM 40 MG: 40 INJECTION SUBCUTANEOUS at 08:46

## 2019-07-29 RX ADMIN — LISINOPRIL 20 MG: 20 TABLET ORAL at 20:12

## 2019-07-29 RX ADMIN — METOPROLOL SUCCINATE 50 MG: 50 TABLET, FILM COATED, EXTENDED RELEASE ORAL at 20:11

## 2019-07-29 RX ADMIN — METOCLOPRAMIDE 10 MG: 5 INJECTION, SOLUTION INTRAMUSCULAR; INTRAVENOUS at 21:39

## 2019-07-29 RX ADMIN — METOCLOPRAMIDE 10 MG: 5 INJECTION, SOLUTION INTRAMUSCULAR; INTRAVENOUS at 02:30

## 2019-07-29 RX ADMIN — METRONIDAZOLE 500 MG: 500 INJECTION, SOLUTION INTRAVENOUS at 11:36

## 2019-07-29 RX ADMIN — LISINOPRIL 20 MG: 20 TABLET ORAL at 08:46

## 2019-07-29 RX ADMIN — Medication 10 ML: at 02:31

## 2019-07-29 RX ADMIN — CIPROFLOXACIN 400 MG: 2 INJECTION, SOLUTION INTRAVENOUS at 02:25

## 2019-07-29 RX ADMIN — METOCLOPRAMIDE 10 MG: 5 INJECTION, SOLUTION INTRAMUSCULAR; INTRAVENOUS at 17:39

## 2019-07-29 RX ADMIN — Medication 10 ML: at 20:13

## 2019-07-29 RX ADMIN — KETOROLAC TROMETHAMINE 30 MG: 30 INJECTION, SOLUTION INTRAMUSCULAR; INTRAVENOUS at 11:36

## 2019-07-29 ASSESSMENT — PAIN SCALES - GENERAL
PAINLEVEL_OUTOF10: 0
PAINLEVEL_OUTOF10: 9
PAINLEVEL_OUTOF10: 0
PAINLEVEL_OUTOF10: 8
PAINLEVEL_OUTOF10: 9
PAINLEVEL_OUTOF10: 8
PAINLEVEL_OUTOF10: 0
PAINLEVEL_OUTOF10: 8
PAINLEVEL_OUTOF10: 8
PAINLEVEL_OUTOF10: 9

## 2019-07-29 ASSESSMENT — PAIN DESCRIPTION - LOCATION: LOCATION: ABDOMEN

## 2019-07-29 ASSESSMENT — PAIN DESCRIPTION - PAIN TYPE: TYPE: SURGICAL PAIN

## 2019-07-29 ASSESSMENT — PAIN DESCRIPTION - ORIENTATION: ORIENTATION: MID

## 2019-07-29 NOTE — PROGRESS NOTES
dextrose 50 % IV solution  12.5 g Intravenous PRN Thuan Johnson MD        glucagon (rDNA) injection 1 mg  1 mg Intramuscular PRN Thuan Johnson MD        dextrose 5 % solution  100 mL/hr Intravenous PRN Thuan Johnson MD        morphine injection 4 mg  4 mg Intravenous Q3H PRN Dolores Arnold MD   4 mg at 07/26/19 1823    prochlorperazine (COMPAZINE) injection 10 mg  10 mg Intravenous Q6H PRN Dolores Arnold MD        iohexol (OMNIPAQUE 240) injection 150 mL  150 mL Oral ONCE PRN Dolores Arnold MD        sodium chloride flush 0.9 % injection 10 mL  10 mL Intravenous 2 times per day Dolores Arnold MD   10 mL at 07/28/19 2016    sodium chloride flush 0.9 % injection 10 mL  10 mL Intravenous PRN Dolores Arnold MD   10 mL at 07/29/19 0231    magnesium hydroxide (MILK OF MAGNESIA) 400 MG/5ML suspension 30 mL  30 mL Oral Daily PRN Dolores Arnold MD        ondansetron [facility]) injection 4 mg  4 mg Intravenous Q6H PRN Dolores Arnold MD   4 mg at 07/26/19 0855    enoxaparin (LOVENOX) injection 40 mg  40 mg Subcutaneous Daily Dolores Arnold MD   40 mg at 07/29/19 0846    labetalol (NORMODYNE;TRANDATE) injection 10 mg  10 mg Intravenous Q6H PRN Dolores Arnold MD   10 mg at 07/29/19 1203    ciprofloxacin (CIPRO) IVPB 400 mg  400 mg Intravenous Q12H Dolores Arnold  mL/hr at 07/29/19 1521 400 mg at 07/29/19 1521    hydrALAZINE (APRESOLINE) injection 10 mg  10 mg Intravenous Q6H PRN Dolores Arnold MD   10 mg at 07/28/19 1429    pantoprazole (PROTONIX) injection 40 mg  40 mg Intravenous Daily Dolores Arnold MD   40 mg at 07/29/19 1871     Allergies   Allergen Reactions    Penicillins Swelling    Clindamycin/Lincomycin Rash    Sulfa Antibiotics Rash     Principal Problem:    SBO (small bowel obstruction) (HCC)  Active Problems:    Hypertension    Depression    GERD (gastroesophageal reflux disease)    DM2 (diabetes mellitus, type

## 2019-07-29 NOTE — PROGRESS NOTES
[facility]    7/29/2019 9:34 AM        Name: Natalie Zimmer .               Admitted: 7/24/2019  Primary Care Provider: Dariusz Dean MD (Tel: [phone])      Subjective:   Doing well  +abd pain - controlled  No flatus, BM yet  Denies SOB    Reviewed interval ancillary notes    Current Medications    metronidazole (FLAGYL) 500 mg in NaCl 100 mL IVPB premix Q8H   lisinopril (PRINIVIL;ZESTRIL) tablet 20 mg Daily   0.9% NaCl with KCl 20 mEq infusion Continuous   ketorolac (TORADOL) injection 30 mg Q6H PRN   metoprolol (LOPRESSOR) injection 5 mg Q6H PRN   metoprolol succinate (TOPROL XL) extended release tablet 50 mg Daily   acetaminophen (OFIRMEV) infusion 1,000 mg Once   naloxone (NARCAN) injection 0.4 mg PRN   HYDROmorphone (DILAUDID) 10 mg/50 mL PCA Continuous   acetaminophen (OFIRMEV) infusion 1,000 mg Q8H   propofol injection Titrated   fentaNYL (SUBLIMAZE) 1,000 mcg in sodium chloride 0.9 % 100 mL infusion Continuous   insulin lispro (HUMALOG) injection pen 0-6 Units TID WC   insulin lispro (HUMALOG) injection pen 0-3 Units Nightly   glucose (GLUTOSE) 40 % oral gel 15 g PRN   dextrose 50 % IV solution PRN   glucagon (rDNA) injection 1 mg PRN   dextrose 5 % solution PRN   morphine injection 4 mg Q3H PRN   prochlorperazine (COMPAZINE) injection 10 mg Q6H PRN   iohexol (OMNIPAQUE 240) injection 150 mL ONCE PRN   sodium chloride flush 0.9 % injection 10 mL 2 times per day   sodium chloride flush 0.9 % injection 10 mL PRN   magnesium hydroxide (MILK OF MAGNESIA) 400 MG/5ML suspension 30 mL Daily PRN   ondansetron (ZOFRAN) injection 4 mg Q6H PRN   enoxaparin (LOVENOX) injection 40 mg Daily   labetalol (NORMODYNE;TRANDATE) injection 10 mg Q6H PRN   ciprofloxacin (CIPRO) IVPB 400 mg Q12H   hydrALAZINE (APRESOLINE) injection 10 mg Q6H PRN   pantoprazole (PROTONIX) injection 40 mg Daily       Objective:  BP (!) Intubated postop. Currently extubated    Diabetes  mellitus controlled we will give sliding scale     Blood pressure notcontrolled.   On Lopressor and P.o. lisinopril    Transfer  to the floor    Diet: Diet NPO Effective Now Exceptions are: Ice Chips, Popsicles  Code:Full Code  DVT PPX lovenox  Disposition pending      Lidia Yanes MD   7/29/2019 9:34 AM

## 2019-07-29 NOTE — PROGRESS NOTES
Resting comfortably with eyes closed and room darkened. Call light within reach. Dilaudid PCA infusing, using appropriately.

## 2019-07-29 NOTE — PLAN OF CARE
Problem: Serum Glucose Level - Abnormal:  Goal: Ability to maintain appropriate glucose levels will improve  Description  Ability to maintain appropriate glucose levels will improve  Outcome: Ongoing     Blood glucose monitoring in place with Insulin sliding scale.

## 2019-07-29 NOTE — PROGRESS NOTES
Reassessment complete. Very hypoactive bowel sounds. Denies passing gas. Abdomen slightly taut. NG to LCWS. Lungs CTA. Continues to rate pain 8/10 when asked. Able to rest and slept well most of shift. Walked to bathroom and some in room. Gait steady. Call light within reach. Denies needs at present.

## 2019-07-30 LAB
ANION GAP SERPL CALCULATED.3IONS-SCNC: 16 MMOL/L (ref 3–16)
BUN BLDV-MCNC: 4 MG/DL (ref 7–20)
CALCIUM SERPL-MCNC: 9.4 MG/DL (ref 8.3–10.6)
CHLORIDE BLD-SCNC: 95 MMOL/L (ref 99–110)
CO2: 27 MMOL/L (ref 21–32)
CREAT SERPL-MCNC: <0.5 MG/DL (ref 0.6–1.1)
GFR AFRICAN AMERICAN: >60
GFR NON-AFRICAN AMERICAN: >60
GLUCOSE BLD-MCNC: 159 MG/DL (ref 70–99)
GLUCOSE BLD-MCNC: 161 MG/DL (ref 70–99)
HCT VFR BLD CALC: 31.7 % (ref 36–48)
HEMOGLOBIN: 10.4 G/DL (ref 12–16)
MCH RBC QN AUTO: 26.4 PG (ref 26–34)
MCHC RBC AUTO-ENTMCNC: 32.8 G/DL (ref 31–36)
MCV RBC AUTO: 80.4 FL (ref 80–100)
PDW BLD-RTO: 14.2 % (ref 12.4–15.4)
PERFORMED ON: ABNORMAL
PLATELET # BLD: 404 K/UL (ref 135–450)
PMV BLD AUTO: 7.2 FL (ref 5–10.5)
POTASSIUM SERPL-SCNC: 3 MMOL/L (ref 3.5–5.1)
RBC # BLD: 3.95 M/UL (ref 4–5.2)
REASON FOR REJECTION: NORMAL
REJECTED TEST: NORMAL
SODIUM BLD-SCNC: 138 MMOL/L (ref 136–145)
WBC # BLD: 7.6 K/UL (ref 4–11)

## 2019-07-30 PROCEDURE — 6360000002 HC RX W HCPCS: Performed by: SURGERY

## 2019-07-30 PROCEDURE — 2580000003 HC RX 258: Performed by: SURGERY

## 2019-07-30 PROCEDURE — 80048 BASIC METABOLIC PNL TOTAL CA: CPT

## 2019-07-30 PROCEDURE — 6360000002 HC RX W HCPCS: Performed by: INTERNAL MEDICINE

## 2019-07-30 PROCEDURE — 6370000000 HC RX 637 (ALT 250 FOR IP): Performed by: INTERNAL MEDICINE

## 2019-07-30 PROCEDURE — 2500000003 HC RX 250 WO HCPCS: Performed by: SURGERY

## 2019-07-30 PROCEDURE — 2500000003 HC RX 250 WO HCPCS: Performed by: INTERNAL MEDICINE

## 2019-07-30 PROCEDURE — 2100000000 HC CCU R&B

## 2019-07-30 PROCEDURE — 2700000000 HC OXYGEN THERAPY PER DAY

## 2019-07-30 PROCEDURE — C9113 INJ PANTOPRAZOLE SODIUM, VIA: HCPCS | Performed by: SURGERY

## 2019-07-30 PROCEDURE — 85027 COMPLETE CBC AUTOMATED: CPT

## 2019-07-30 PROCEDURE — 94760 N-INVAS EAR/PLS OXIMETRY 1: CPT

## 2019-07-30 PROCEDURE — 2000000000 HC ICU R&B

## 2019-07-30 PROCEDURE — 36415 COLL VENOUS BLD VENIPUNCTURE: CPT

## 2019-07-30 PROCEDURE — 99024 POSTOP FOLLOW-UP VISIT: CPT | Performed by: SURGERY

## 2019-07-30 RX ORDER — AMLODIPINE BESYLATE 5 MG/1
5 TABLET ORAL DAILY
Status: DISCONTINUED | OUTPATIENT
Start: 2019-07-30 | End: 2019-07-31

## 2019-07-30 RX ORDER — POTASSIUM CHLORIDE 7.45 MG/ML
10 INJECTION INTRAVENOUS
Status: COMPLETED | OUTPATIENT
Start: 2019-07-30 | End: 2019-07-31

## 2019-07-30 RX ORDER — BISACODYL 10 MG
10 SUPPOSITORY, RECTAL RECTAL DAILY PRN
Status: DISCONTINUED | OUTPATIENT
Start: 2019-07-30 | End: 2019-08-02 | Stop reason: HOSPADM

## 2019-07-30 RX ORDER — LABETALOL HYDROCHLORIDE 5 MG/ML
20 INJECTION, SOLUTION INTRAVENOUS EVERY 4 HOURS PRN
Status: DISCONTINUED | OUTPATIENT
Start: 2019-07-30 | End: 2019-08-02 | Stop reason: HOSPADM

## 2019-07-30 RX ORDER — POTASSIUM CHLORIDE 7.45 MG/ML
10 INJECTION INTRAVENOUS PRN
Status: DISCONTINUED | OUTPATIENT
Start: 2019-07-30 | End: 2019-08-02 | Stop reason: HOSPADM

## 2019-07-30 RX ADMIN — NICARDIPINE HYDROCHLORIDE 5 MG/HR: 0.1 INJECTION, SOLUTION INTRAVENOUS at 21:39

## 2019-07-30 RX ADMIN — METOCLOPRAMIDE 10 MG: 5 INJECTION, SOLUTION INTRAMUSCULAR; INTRAVENOUS at 23:22

## 2019-07-30 RX ADMIN — HYDROMORPHONE HYDROCHLORIDE 0.5 MG: 1 INJECTION, SOLUTION INTRAMUSCULAR; INTRAVENOUS; SUBCUTANEOUS at 17:43

## 2019-07-30 RX ADMIN — POTASSIUM CHLORIDE 10 MEQ: 7.46 INJECTION, SOLUTION INTRAVENOUS at 22:50

## 2019-07-30 RX ADMIN — METRONIDAZOLE 500 MG: 500 INJECTION, SOLUTION INTRAVENOUS at 01:47

## 2019-07-30 RX ADMIN — POTASSIUM CHLORIDE AND SODIUM CHLORIDE 50 ML/HR: 900; 150 INJECTION, SOLUTION INTRAVENOUS at 21:28

## 2019-07-30 RX ADMIN — Medication 10 ML: at 15:38

## 2019-07-30 RX ADMIN — HYDROMORPHONE HYDROCHLORIDE 0.5 MG: 1 INJECTION, SOLUTION INTRAMUSCULAR; INTRAVENOUS; SUBCUTANEOUS at 05:07

## 2019-07-30 RX ADMIN — Medication 10 ML: at 17:43

## 2019-07-30 RX ADMIN — METRONIDAZOLE 500 MG: 500 INJECTION, SOLUTION INTRAVENOUS at 17:58

## 2019-07-30 RX ADMIN — CIPROFLOXACIN 400 MG: 2 INJECTION, SOLUTION INTRAVENOUS at 02:32

## 2019-07-30 RX ADMIN — Medication 10 ML: at 16:50

## 2019-07-30 RX ADMIN — HYDROMORPHONE HYDROCHLORIDE 0.5 MG: 1 INJECTION, SOLUTION INTRAMUSCULAR; INTRAVENOUS; SUBCUTANEOUS at 09:26

## 2019-07-30 RX ADMIN — POTASSIUM CHLORIDE AND SODIUM CHLORIDE: 900; 150 INJECTION, SOLUTION INTRAVENOUS at 01:45

## 2019-07-30 RX ADMIN — POTASSIUM CHLORIDE 10 MEQ: 7.46 INJECTION, SOLUTION INTRAVENOUS at 23:14

## 2019-07-30 RX ADMIN — NICARDIPINE HYDROCHLORIDE 12.5 MG/HR: 0.1 INJECTION, SOLUTION INTRAVENOUS at 17:48

## 2019-07-30 RX ADMIN — METOCLOPRAMIDE 10 MG: 5 INJECTION, SOLUTION INTRAMUSCULAR; INTRAVENOUS at 04:05

## 2019-07-30 RX ADMIN — LABETALOL HYDROCHLORIDE 10 MG: 5 INJECTION, SOLUTION INTRAVENOUS at 05:07

## 2019-07-30 RX ADMIN — LABETALOL HYDROCHLORIDE 20 MG: 5 INJECTION INTRAVENOUS at 11:48

## 2019-07-30 RX ADMIN — POTASSIUM CHLORIDE 10 MEQ: 7.46 INJECTION, SOLUTION INTRAVENOUS at 21:40

## 2019-07-30 RX ADMIN — ENOXAPARIN SODIUM 40 MG: 40 INJECTION SUBCUTANEOUS at 09:37

## 2019-07-30 RX ADMIN — POTASSIUM CHLORIDE 10 MEQ: 7.46 INJECTION, SOLUTION INTRAVENOUS at 21:10

## 2019-07-30 RX ADMIN — PROCHLORPERAZINE EDISYLATE 10 MG: 5 INJECTION INTRAMUSCULAR; INTRAVENOUS at 19:34

## 2019-07-30 RX ADMIN — NICARDIPINE HYDROCHLORIDE 12.5 MG/HR: 0.1 INJECTION, SOLUTION INTRAVENOUS at 19:34

## 2019-07-30 RX ADMIN — KETOROLAC TROMETHAMINE 30 MG: 30 INJECTION, SOLUTION INTRAMUSCULAR; INTRAVENOUS at 19:35

## 2019-07-30 RX ADMIN — METRONIDAZOLE 500 MG: 500 INJECTION, SOLUTION INTRAVENOUS at 09:40

## 2019-07-30 RX ADMIN — Medication 10 ML: at 20:37

## 2019-07-30 RX ADMIN — AMLODIPINE BESYLATE 5 MG: 5 TABLET ORAL at 14:35

## 2019-07-30 RX ADMIN — PANTOPRAZOLE SODIUM 40 MG: 40 INJECTION, POWDER, FOR SOLUTION INTRAVENOUS at 09:37

## 2019-07-30 RX ADMIN — FUROSEMIDE 20 MG: 20 TABLET ORAL at 09:28

## 2019-07-30 RX ADMIN — METOCLOPRAMIDE 10 MG: 5 INJECTION, SOLUTION INTRAMUSCULAR; INTRAVENOUS at 09:36

## 2019-07-30 RX ADMIN — METOCLOPRAMIDE 10 MG: 5 INJECTION, SOLUTION INTRAMUSCULAR; INTRAVENOUS at 16:49

## 2019-07-30 RX ADMIN — HYDROMORPHONE HYDROCHLORIDE 0.5 MG: 1 INJECTION, SOLUTION INTRAMUSCULAR; INTRAVENOUS; SUBCUTANEOUS at 15:38

## 2019-07-30 RX ADMIN — HYDROMORPHONE HYDROCHLORIDE 0.5 MG: 1 INJECTION, SOLUTION INTRAMUSCULAR; INTRAVENOUS; SUBCUTANEOUS at 11:47

## 2019-07-30 RX ADMIN — CIPROFLOXACIN 400 MG: 2 INJECTION, SOLUTION INTRAVENOUS at 14:55

## 2019-07-30 RX ADMIN — NICARDIPINE HYDROCHLORIDE 12.5 MG/HR: 0.1 INJECTION, SOLUTION INTRAVENOUS at 16:06

## 2019-07-30 RX ADMIN — LISINOPRIL 40 MG: 20 TABLET ORAL at 09:30

## 2019-07-30 RX ADMIN — HYDROMORPHONE HYDROCHLORIDE 0.5 MG: 1 INJECTION, SOLUTION INTRAMUSCULAR; INTRAVENOUS; SUBCUTANEOUS at 13:27

## 2019-07-30 RX ADMIN — HYDROMORPHONE HYDROCHLORIDE 0.5 MG: 1 INJECTION, SOLUTION INTRAMUSCULAR; INTRAVENOUS; SUBCUTANEOUS at 21:06

## 2019-07-30 RX ADMIN — NICARDIPINE HYDROCHLORIDE 5 MG/HR: 0.1 INJECTION, SOLUTION INTRAVENOUS at 14:14

## 2019-07-30 RX ADMIN — HYDROMORPHONE HYDROCHLORIDE 0.5 MG: 1 INJECTION, SOLUTION INTRAMUSCULAR; INTRAVENOUS; SUBCUTANEOUS at 02:33

## 2019-07-30 RX ADMIN — METOPROLOL SUCCINATE 100 MG: 50 TABLET, FILM COATED, EXTENDED RELEASE ORAL at 09:29

## 2019-07-30 ASSESSMENT — PAIN SCALES - GENERAL
PAINLEVEL_OUTOF10: 8
PAINLEVEL_OUTOF10: 8
PAINLEVEL_OUTOF10: 7
PAINLEVEL_OUTOF10: 8
PAINLEVEL_OUTOF10: 7
PAINLEVEL_OUTOF10: 8
PAINLEVEL_OUTOF10: 7
PAINLEVEL_OUTOF10: 6
PAINLEVEL_OUTOF10: 8
PAINLEVEL_OUTOF10: 8
PAINLEVEL_OUTOF10: 6
PAINLEVEL_OUTOF10: 8
PAINLEVEL_OUTOF10: 7

## 2019-07-30 ASSESSMENT — PAIN DESCRIPTION - LOCATION
LOCATION: ABDOMEN;INCISION
LOCATION: ABDOMEN

## 2019-07-30 ASSESSMENT — PAIN DESCRIPTION - DESCRIPTORS: DESCRIPTORS: CRAMPING

## 2019-07-30 ASSESSMENT — PAIN DESCRIPTION - FREQUENCY: FREQUENCY: CONTINUOUS

## 2019-07-30 ASSESSMENT — PAIN DESCRIPTION - PAIN TYPE
TYPE: SURGICAL PAIN
TYPE: SURGICAL PAIN

## 2019-07-30 ASSESSMENT — PAIN DESCRIPTION - ONSET: ONSET: ON-GOING

## 2019-07-30 ASSESSMENT — PAIN DESCRIPTION - PROGRESSION: CLINICAL_PROGRESSION: NOT CHANGED

## 2019-07-30 ASSESSMENT — PAIN DESCRIPTION - ORIENTATION: ORIENTATION: MID

## 2019-07-30 NOTE — PROGRESS NOTES
tube    Intubated postop. Currently extubated    Diabetes  mellitus controlled we will give sliding scale      blood pressure notcontrolled. On Lopressor. P.o. lisinopril initiated.   Will give IV labetalol will also add Norvasc 5 mg    IV fluids and potassium supplementation      Diet: Diet NPO Effective Now Exceptions are: Ice Chips, Popsicles  Code:Full Code  DVT PPX lovenox  Disposition pending      MD Zeenat   7/30/2019 12:40 PM

## 2019-07-30 NOTE — PROGRESS NOTES
0606- pt resting awake in chair. NG clamped and PO BP meds given. Gave 0.5 mg dilaudid IVP for 8/10 abd pain. Assessment completed at bedside. PO meds taken easily with water. 1030- pt ambulated nearly full lap of unit hallways    1110 obtained manual BP at 1106 and per monitor at 1107. Both are very elevated. Discussed with Dr Sumeet Humphrey while he was rounding on 4t. Verbal order taken for labetalol 20 mg IVP M4E for systolic > 409. Requests to be informed of bp 60-90 min after admin to evaluate efficacy. 1148- labetalol given, had been previously unavailable from [place], spoke to pharmacy and now available. Gave slowly IVP. 1317- bp still very elevated, systolic > 400. Page placed to Dr Sumeet Humphrey to inform. Dr Sumeet Humphrey remotely entered orders to transfer to ICU or CVU. 1330- report called to Concihs Porter RN in [facility] 177 will transfer to room 6    1400- pt transported to CVU room 6. Gave bedside handoff to [employer]. Possessions collected and taken by Jackie Yan 117 from [facility] to [phone].

## 2019-07-31 ENCOUNTER — APPOINTMENT (OUTPATIENT)
Dept: CT IMAGING | Age: 49
DRG: 329 | End: 2019-07-31
Payer: COMMERCIAL

## 2019-07-31 LAB
ANION GAP SERPL CALCULATED.3IONS-SCNC: 14 MMOL/L (ref 3–16)
BUN BLDV-MCNC: 6 MG/DL (ref 7–20)
CALCIUM SERPL-MCNC: 9.2 MG/DL (ref 8.3–10.6)
CHLORIDE BLD-SCNC: 99 MMOL/L (ref 99–110)
CO2: 27 MMOL/L (ref 21–32)
CREAT SERPL-MCNC: <0.5 MG/DL (ref 0.6–1.1)
GFR AFRICAN AMERICAN: >60
GFR NON-AFRICAN AMERICAN: >60
GLUCOSE BLD-MCNC: 105 MG/DL (ref 70–99)
GLUCOSE BLD-MCNC: 114 MG/DL (ref 70–99)
GLUCOSE BLD-MCNC: 126 MG/DL (ref 70–99)
GLUCOSE BLD-MCNC: 235 MG/DL (ref 70–99)
HCT VFR BLD CALC: 30.1 % (ref 36–48)
HEMOGLOBIN: 10 G/DL (ref 12–16)
MCH RBC QN AUTO: 26.6 PG (ref 26–34)
MCHC RBC AUTO-ENTMCNC: 33.3 G/DL (ref 31–36)
MCV RBC AUTO: 79.9 FL (ref 80–100)
PDW BLD-RTO: 14.1 % (ref 12.4–15.4)
PERFORMED ON: ABNORMAL
PLATELET # BLD: 397 K/UL (ref 135–450)
PMV BLD AUTO: 6.6 FL (ref 5–10.5)
POTASSIUM SERPL-SCNC: 3.2 MMOL/L (ref 3.5–5.1)
RBC # BLD: 3.76 M/UL (ref 4–5.2)
SODIUM BLD-SCNC: 140 MMOL/L (ref 136–145)
WBC # BLD: 7.8 K/UL (ref 4–11)

## 2019-07-31 PROCEDURE — C9113 INJ PANTOPRAZOLE SODIUM, VIA: HCPCS | Performed by: SURGERY

## 2019-07-31 PROCEDURE — 6360000002 HC RX W HCPCS: Performed by: INTERNAL MEDICINE

## 2019-07-31 PROCEDURE — 87040 BLOOD CULTURE FOR BACTERIA: CPT

## 2019-07-31 PROCEDURE — 2500000003 HC RX 250 WO HCPCS: Performed by: INTERNAL MEDICINE

## 2019-07-31 PROCEDURE — 6360000002 HC RX W HCPCS: Performed by: SURGERY

## 2019-07-31 PROCEDURE — 6360000004 HC RX CONTRAST MEDICATION: Performed by: SURGERY

## 2019-07-31 PROCEDURE — 94761 N-INVAS EAR/PLS OXIMETRY MLT: CPT

## 2019-07-31 PROCEDURE — 6370000000 HC RX 637 (ALT 250 FOR IP): Performed by: INTERNAL MEDICINE

## 2019-07-31 PROCEDURE — 85027 COMPLETE CBC AUTOMATED: CPT

## 2019-07-31 PROCEDURE — 36569 INSJ PICC 5 YR+ W/O IMAGING: CPT

## 2019-07-31 PROCEDURE — 2000000000 HC ICU R&B

## 2019-07-31 PROCEDURE — 2500000003 HC RX 250 WO HCPCS: Performed by: SURGERY

## 2019-07-31 PROCEDURE — 2580000003 HC RX 258: Performed by: SURGERY

## 2019-07-31 PROCEDURE — 02HV33Z INSERTION OF INFUSION DEVICE INTO SUPERIOR VENA CAVA, PERCUTANEOUS APPROACH: ICD-10-PCS | Performed by: SURGERY

## 2019-07-31 PROCEDURE — 2700000000 HC OXYGEN THERAPY PER DAY

## 2019-07-31 PROCEDURE — C1751 CATH, INF, PER/CENT/MIDLINE: HCPCS

## 2019-07-31 PROCEDURE — 99024 POSTOP FOLLOW-UP VISIT: CPT | Performed by: SURGERY

## 2019-07-31 PROCEDURE — 80048 BASIC METABOLIC PNL TOTAL CA: CPT

## 2019-07-31 RX ORDER — LIDOCAINE HYDROCHLORIDE 10 MG/ML
5 INJECTION, SOLUTION EPIDURAL; INFILTRATION; INTRACAUDAL; PERINEURAL ONCE
Status: DISCONTINUED | OUTPATIENT
Start: 2019-07-31 | End: 2019-08-02 | Stop reason: HOSPADM

## 2019-07-31 RX ORDER — AMLODIPINE BESYLATE 5 MG/1
10 TABLET ORAL DAILY
Status: DISCONTINUED | OUTPATIENT
Start: 2019-07-31 | End: 2019-08-01

## 2019-07-31 RX ORDER — SODIUM CHLORIDE 0.9 % (FLUSH) 0.9 %
10 SYRINGE (ML) INJECTION EVERY 12 HOURS SCHEDULED
Status: DISCONTINUED | OUTPATIENT
Start: 2019-07-31 | End: 2019-08-02 | Stop reason: HOSPADM

## 2019-07-31 RX ORDER — ACETAMINOPHEN 10 MG/ML
1000 INJECTION, SOLUTION INTRAVENOUS EVERY 8 HOURS
Status: COMPLETED | OUTPATIENT
Start: 2019-07-31 | End: 2019-08-01

## 2019-07-31 RX ORDER — POTASSIUM CHLORIDE 29.8 MG/ML
20 INJECTION INTRAVENOUS ONCE
Status: DISCONTINUED | OUTPATIENT
Start: 2019-07-31 | End: 2019-07-31

## 2019-07-31 RX ORDER — SODIUM CHLORIDE 0.9 % (FLUSH) 0.9 %
10 SYRINGE (ML) INJECTION PRN
Status: DISCONTINUED | OUTPATIENT
Start: 2019-07-31 | End: 2019-08-02 | Stop reason: HOSPADM

## 2019-07-31 RX ADMIN — AMLODIPINE BESYLATE 10 MG: 5 TABLET ORAL at 08:57

## 2019-07-31 RX ADMIN — POTASSIUM CHLORIDE 10 MEQ: 7.46 INJECTION, SOLUTION INTRAVENOUS at 13:03

## 2019-07-31 RX ADMIN — ENOXAPARIN SODIUM 40 MG: 40 INJECTION SUBCUTANEOUS at 08:56

## 2019-07-31 RX ADMIN — CIPROFLOXACIN 400 MG: 2 INJECTION, SOLUTION INTRAVENOUS at 03:25

## 2019-07-31 RX ADMIN — POTASSIUM CHLORIDE 10 MEQ: 7.46 INJECTION, SOLUTION INTRAVENOUS at 02:15

## 2019-07-31 RX ADMIN — METRONIDAZOLE 500 MG: 500 INJECTION, SOLUTION INTRAVENOUS at 03:25

## 2019-07-31 RX ADMIN — INSULIN LISPRO 1 UNITS: 100 INJECTION, SOLUTION INTRAVENOUS; SUBCUTANEOUS at 20:58

## 2019-07-31 RX ADMIN — PROCHLORPERAZINE EDISYLATE 10 MG: 5 INJECTION INTRAMUSCULAR; INTRAVENOUS at 03:26

## 2019-07-31 RX ADMIN — PANTOPRAZOLE SODIUM 40 MG: 40 INJECTION, POWDER, FOR SOLUTION INTRAVENOUS at 08:57

## 2019-07-31 RX ADMIN — METOCLOPRAMIDE 10 MG: 5 INJECTION, SOLUTION INTRAMUSCULAR; INTRAVENOUS at 23:13

## 2019-07-31 RX ADMIN — MORPHINE SULFATE 4 MG: 4 INJECTION INTRAVENOUS at 11:20

## 2019-07-31 RX ADMIN — HYDROMORPHONE HYDROCHLORIDE 0.5 MG: 1 INJECTION, SOLUTION INTRAMUSCULAR; INTRAVENOUS; SUBCUTANEOUS at 00:33

## 2019-07-31 RX ADMIN — POTASSIUM CHLORIDE AND SODIUM CHLORIDE: 900; 150 INJECTION, SOLUTION INTRAVENOUS at 21:36

## 2019-07-31 RX ADMIN — POTASSIUM CHLORIDE 10 MEQ: 7.46 INJECTION, SOLUTION INTRAVENOUS at 11:57

## 2019-07-31 RX ADMIN — FUROSEMIDE 20 MG: 20 TABLET ORAL at 08:57

## 2019-07-31 RX ADMIN — METOPROLOL TARTRATE 5 MG: 1 INJECTION, SOLUTION INTRAVENOUS at 05:03

## 2019-07-31 RX ADMIN — CIPROFLOXACIN 400 MG: 2 INJECTION, SOLUTION INTRAVENOUS at 14:00

## 2019-07-31 RX ADMIN — NICARDIPINE HYDROCHLORIDE 5 MG/HR: 0.1 INJECTION, SOLUTION INTRAVENOUS at 09:45

## 2019-07-31 RX ADMIN — LISINOPRIL 40 MG: 20 TABLET ORAL at 08:57

## 2019-07-31 RX ADMIN — METOCLOPRAMIDE 10 MG: 5 INJECTION, SOLUTION INTRAMUSCULAR; INTRAVENOUS at 10:27

## 2019-07-31 RX ADMIN — METOCLOPRAMIDE 10 MG: 5 INJECTION, SOLUTION INTRAMUSCULAR; INTRAVENOUS at 05:02

## 2019-07-31 RX ADMIN — Medication 10 ML: at 08:57

## 2019-07-31 RX ADMIN — HYDRALAZINE HYDROCHLORIDE 20 MG: 20 INJECTION INTRAMUSCULAR; INTRAVENOUS at 18:22

## 2019-07-31 RX ADMIN — METRONIDAZOLE 500 MG: 500 INJECTION, SOLUTION INTRAVENOUS at 10:09

## 2019-07-31 RX ADMIN — METRONIDAZOLE 500 MG: 500 INJECTION, SOLUTION INTRAVENOUS at 17:08

## 2019-07-31 RX ADMIN — Medication 10 ML: at 20:56

## 2019-07-31 RX ADMIN — METOPROLOL SUCCINATE 100 MG: 50 TABLET, FILM COATED, EXTENDED RELEASE ORAL at 08:57

## 2019-07-31 RX ADMIN — ACETAMINOPHEN 1000 MG: 10 INJECTION, SOLUTION INTRAVENOUS at 10:21

## 2019-07-31 RX ADMIN — ACETAMINOPHEN 1000 MG: 10 INJECTION, SOLUTION INTRAVENOUS at 18:01

## 2019-07-31 RX ADMIN — KETOROLAC TROMETHAMINE 30 MG: 30 INJECTION, SOLUTION INTRAMUSCULAR; INTRAVENOUS at 03:25

## 2019-07-31 RX ADMIN — METOCLOPRAMIDE 10 MG: 5 INJECTION, SOLUTION INTRAMUSCULAR; INTRAVENOUS at 17:08

## 2019-07-31 RX ADMIN — POTASSIUM CHLORIDE 10 MEQ: 7.46 INJECTION, SOLUTION INTRAVENOUS at 15:15

## 2019-07-31 RX ADMIN — POTASSIUM CHLORIDE 10 MEQ: 7.46 INJECTION, SOLUTION INTRAVENOUS at 00:37

## 2019-07-31 RX ADMIN — POTASSIUM CHLORIDE 10 MEQ: 7.46 INJECTION, SOLUTION INTRAVENOUS at 10:20

## 2019-07-31 RX ADMIN — HYDRALAZINE HYDROCHLORIDE 20 MG: 20 INJECTION INTRAMUSCULAR; INTRAVENOUS at 05:03

## 2019-07-31 RX ADMIN — HYDROMORPHONE HYDROCHLORIDE 0.5 MG: 1 INJECTION, SOLUTION INTRAMUSCULAR; INTRAVENOUS; SUBCUTANEOUS at 05:02

## 2019-07-31 ASSESSMENT — PAIN SCALES - GENERAL
PAINLEVEL_OUTOF10: 4
PAINLEVEL_OUTOF10: 8
PAINLEVEL_OUTOF10: 0
PAINLEVEL_OUTOF10: 8
PAINLEVEL_OUTOF10: 3
PAINLEVEL_OUTOF10: 8
PAINLEVEL_OUTOF10: 8

## 2019-07-31 ASSESSMENT — PAIN DESCRIPTION - ORIENTATION
ORIENTATION: LOWER

## 2019-07-31 ASSESSMENT — PAIN DESCRIPTION - PAIN TYPE
TYPE: SURGICAL PAIN

## 2019-07-31 ASSESSMENT — PAIN DESCRIPTION - LOCATION
LOCATION: ABDOMEN;INCISION

## 2019-07-31 ASSESSMENT — PAIN DESCRIPTION - ONSET: ONSET: GRADUAL

## 2019-07-31 ASSESSMENT — PAIN DESCRIPTION - FREQUENCY
FREQUENCY: CONTINUOUS
FREQUENCY: INTERMITTENT

## 2019-07-31 ASSESSMENT — PAIN DESCRIPTION - DESCRIPTORS
DESCRIPTORS: CRAMPING
DESCRIPTORS: DISCOMFORT

## 2019-07-31 NOTE — PROGRESS NOTES
Pt back to room from CT scan, NG tube removed without difficulty per orders, will continue to monitor.

## 2019-07-31 NOTE — PLAN OF CARE
Problem: Discharge Planning:  Goal: Ability to perform activities of daily living will improve  Description  Ability to perform activities of daily living will improve  Outcome: Met This Shift  Goal: Absence of nausea/vomiting  Description  Absence of nausea/vomiting  Outcome: Met This Shift     Problem: Nausea/Vomiting:  Goal: Absence of nausea/vomiting  Description  Absence of nausea/vomiting  Outcome: Met This Shift     Problem:  Bowel Function - Altered:  Goal: Active bowel sounds  Description  Active bowel sounds  Outcome: Met This Shift  Goal: Bowel elimination is within specified parameters  Description  Bowel elimination is within specified parameters  Outcome: Met This Shift     Problem: Venous Thromboembolism - Risk of:  Goal: Will show no signs or symptoms of venous thromboembolism  Description  Will show no signs or symptoms of venous thromboembolism  Outcome: Met This Shift

## 2019-07-31 NOTE — PROGRESS NOTES
Patient down to CT scan at this time, this RN transporting patient and will stay with patient during scan.

## 2019-07-31 NOTE — PROGRESS NOTES
MD Juan   40 mg at 07/31/19 0857    furosemide (LASIX) tablet 20 mg  20 mg Oral Daily Faustina Taylor MD   20 mg at 07/31/19 0857    metoprolol succinate (TOPROL XL) extended release tablet 100 mg  100 mg Oral Daily Opal Ortiz MD   100 mg at 07/31/19 0857    0.9% NaCl with KCl 20 mEq infusion   Intravenous Continuous Opal Ortiz MD 50 mL/hr at 07/31/19 0921      ketorolac (TORADOL) injection 30 mg  30 mg Intravenous Q6H PRN Huyen Erickson MD   30 mg at 07/31/19 0325    metoprolol (LOPRESSOR) injection 5 mg  5 mg Intravenous Q6H PRN Huyen Erickson MD   5 mg at 07/31/19 0503    propofol injection  10 mcg/kg/min Intravenous Titrated Selena Jennings MD   Stopped at 07/27/19 0803    fentaNYL (SUBLIMAZE) 1,000 mcg in sodium chloride 0.9 % 100 mL infusion  1 mcg/kg/hr Intravenous Continuous Selena Jennings MD   Stopped at 07/27/19 0803    insulin lispro (HUMALOG) injection pen 0-6 Units  0-6 Units Subcutaneous TID WC Opal Ortiz MD   Stopped at 07/28/19 1316    insulin lispro (HUMALOG) injection pen 0-3 Units  0-3 Units Subcutaneous Nightly Opal Ortiz MD   Stopped at 07/28/19 2015    glucose (GLUTOSE) 40 % oral gel 15 g  15 g Oral PRN Benson Belcher MD        dextrose 50 % IV solution  12.5 g Intravenous PRN Opal Ortiz MD        glucagon (rDNA) injection 1 mg  1 mg Intramuscular PRN Opal Ortiz MD        dextrose 5 % solution  100 mL/hr Intravenous PRN Opal Ortiz MD        morphine injection 4 mg  4 mg Intravenous Q3H PRN Scot Hansen MD   4 mg at 07/31/19 1120    prochlorperazine (COMPAZINE) injection 10 mg  10 mg Intravenous Q6H PRN Scot Hansen MD   10 mg at 07/31/19 0326    iohexol (OMNIPAQUE 240) injection 150 mL  150 mL Oral ONCE PRN Scot Hansen MD        sodium chloride flush 0.9 % injection 10 mL  10 mL Intravenous 2 times per day Scot Hansen MD   10 mL at 07/31/19 0857    sodium chloride flush 0.9 % injection 10 mL  10 mL Intravenous PRN Adán Vargas MD   10 mL at 07/30/19 1743    magnesium hydroxide (MILK OF MAGNESIA) 400 MG/5ML suspension 30 mL  30 mL Oral Daily PRN Adán Vargas MD        ondansetron [facility]) injection 4 mg  4 mg Intravenous Q6H PRN Adán Vargas MD   4 mg at 07/26/19 0855    enoxaparin (LOVENOX) injection 40 mg  40 mg Subcutaneous Daily Adán Vargas MD   40 mg at 07/31/19 0856    ciprofloxacin (CIPRO) IVPB 400 mg  400 mg Intravenous Q12H Adán Vargas  mL/hr at 07/31/19 1400 400 mg at 07/31/19 1400    pantoprazole (PROTONIX) injection 40 mg  40 mg Intravenous Daily Adán Vargas MD   40 mg at 07/31/19 7300     Allergies   Allergen Reactions    Penicillins Swelling    Clindamycin/Lincomycin Rash    Sulfa Antibiotics Rash     Principal Problem:    SBO (small bowel obstruction) ([facility])  Active Problems:    Hypertension    Depression    GERD (gastroesophageal reflux disease)    DM2 (diabetes mellitus, type 2) ([facility])    UTI (urinary tract infection)    Postoperative respiratory failure ([facility]  Resolved Problems:    * No resolved hospital problems. *    Blood pressure (!) 169/100, pulse 110, temperature 99.9 °F (37.7 °C), temperature source Temporal, resp. rate 18, height 5' 2.5\" (1.588 m), weight 195 lb 8.8 oz (88.7 kg), last menstrual period 12/02/2010, SpO2 93 %. Subjective:  Symptoms:  Stable. Diet:  NPO. Activity level: Returning to normal.    Pain:  She complains of pain that is mild. Objective:  General Appearance:  Comfortable. Vital signs: (most recent): Blood pressure (!) 169/100, pulse 110, temperature 99.9 °F (37.7 °C), temperature source Temporal, resp. rate 18, height 5' 2.5\" (1.588 m), weight 195 lb 8.8 oz (88.7 kg), last menstrual period 12/02/2010, SpO2 93 %. Output: Producing urine and producing stool. Lungs:  Normal effort and normal respiratory rate.     Abdomen: Abdomen is soft and

## 2019-08-01 LAB
ANION GAP SERPL CALCULATED.3IONS-SCNC: 15 MMOL/L (ref 3–16)
BUN BLDV-MCNC: 6 MG/DL (ref 7–20)
CALCIUM SERPL-MCNC: 8.8 MG/DL (ref 8.3–10.6)
CHLORIDE BLD-SCNC: 99 MMOL/L (ref 99–110)
CO2: 25 MMOL/L (ref 21–32)
CREAT SERPL-MCNC: 0.6 MG/DL (ref 0.6–1.1)
GFR AFRICAN AMERICAN: >60
GFR NON-AFRICAN AMERICAN: >60
GLUCOSE BLD-MCNC: 134 MG/DL (ref 70–99)
GLUCOSE BLD-MCNC: 136 MG/DL (ref 70–99)
GLUCOSE BLD-MCNC: 143 MG/DL (ref 70–99)
GLUCOSE BLD-MCNC: 158 MG/DL (ref 70–99)
GLUCOSE BLD-MCNC: 168 MG/DL (ref 70–99)
LV EF: 70 %
LVEF MODALITY: NORMAL
PERFORMED ON: ABNORMAL
POTASSIUM SERPL-SCNC: 3.5 MMOL/L (ref 3.5–5.1)
SODIUM BLD-SCNC: 139 MMOL/L (ref 136–145)

## 2019-08-01 PROCEDURE — 87077 CULTURE AEROBIC IDENTIFY: CPT

## 2019-08-01 PROCEDURE — 6370000000 HC RX 637 (ALT 250 FOR IP): Performed by: SURGERY

## 2019-08-01 PROCEDURE — 6370000000 HC RX 637 (ALT 250 FOR IP): Performed by: INTERNAL MEDICINE

## 2019-08-01 PROCEDURE — 6360000002 HC RX W HCPCS: Performed by: SURGERY

## 2019-08-01 PROCEDURE — C9113 INJ PANTOPRAZOLE SODIUM, VIA: HCPCS | Performed by: SURGERY

## 2019-08-01 PROCEDURE — 80048 BASIC METABOLIC PNL TOTAL CA: CPT

## 2019-08-01 PROCEDURE — 2000000000 HC ICU R&B

## 2019-08-01 PROCEDURE — 87186 SC STD MICRODIL/AGAR DIL: CPT

## 2019-08-01 PROCEDURE — 94761 N-INVAS EAR/PLS OXIMETRY MLT: CPT

## 2019-08-01 PROCEDURE — 93306 TTE W/DOPPLER COMPLETE: CPT

## 2019-08-01 PROCEDURE — 2580000003 HC RX 258: Performed by: SURGERY

## 2019-08-01 PROCEDURE — 99024 POSTOP FOLLOW-UP VISIT: CPT | Performed by: SURGERY

## 2019-08-01 PROCEDURE — 6360000002 HC RX W HCPCS: Performed by: INTERNAL MEDICINE

## 2019-08-01 PROCEDURE — 2500000003 HC RX 250 WO HCPCS: Performed by: INTERNAL MEDICINE

## 2019-08-01 PROCEDURE — 87205 SMEAR GRAM STAIN: CPT

## 2019-08-01 PROCEDURE — 99255 IP/OBS CONSLTJ NEW/EST HI 80: CPT | Performed by: INTERNAL MEDICINE

## 2019-08-01 PROCEDURE — 87070 CULTURE OTHR SPECIMN AEROBIC: CPT

## 2019-08-01 RX ORDER — OXYCODONE HYDROCHLORIDE 5 MG/1
5 TABLET ORAL EVERY 4 HOURS PRN
Qty: 15 TABLET | Refills: 0 | Status: CANCELLED | OUTPATIENT
Start: 2019-08-01 | End: 2019-08-04

## 2019-08-01 RX ORDER — SODIUM CHLORIDE 9 MG/ML
INJECTION, SOLUTION INTRAVENOUS
Status: DISPENSED
Start: 2019-08-01 | End: 2019-08-01

## 2019-08-01 RX ORDER — AMLODIPINE BESYLATE 5 MG/1
5 TABLET ORAL DAILY
Qty: 30 TABLET | Refills: 3 | Status: CANCELLED | OUTPATIENT
Start: 2019-08-02

## 2019-08-01 RX ORDER — DULOXETIN HYDROCHLORIDE 60 MG/1
60 CAPSULE, DELAYED RELEASE ORAL DAILY
Status: DISCONTINUED | OUTPATIENT
Start: 2019-08-01 | End: 2019-08-02 | Stop reason: HOSPADM

## 2019-08-01 RX ORDER — OXYCODONE HYDROCHLORIDE 5 MG/1
10 TABLET ORAL EVERY 4 HOURS PRN
Status: DISCONTINUED | OUTPATIENT
Start: 2019-08-01 | End: 2019-08-02 | Stop reason: HOSPADM

## 2019-08-01 RX ORDER — METOPROLOL TARTRATE 50 MG/1
100 TABLET, FILM COATED ORAL 2 TIMES DAILY
Status: DISCONTINUED | OUTPATIENT
Start: 2019-08-02 | End: 2019-08-01

## 2019-08-01 RX ORDER — LABETALOL 200 MG/1
300 TABLET, FILM COATED ORAL EVERY 12 HOURS SCHEDULED
Status: DISCONTINUED | OUTPATIENT
Start: 2019-08-01 | End: 2019-08-02

## 2019-08-01 RX ORDER — OXYCODONE HYDROCHLORIDE 5 MG/1
5 TABLET ORAL EVERY 4 HOURS PRN
Status: DISCONTINUED | OUTPATIENT
Start: 2019-08-01 | End: 2019-08-02 | Stop reason: HOSPADM

## 2019-08-01 RX ORDER — BUPROPION HYDROCHLORIDE 150 MG/1
150 TABLET ORAL DAILY
Status: DISCONTINUED | OUTPATIENT
Start: 2019-08-01 | End: 2019-08-02 | Stop reason: HOSPADM

## 2019-08-01 RX ORDER — AMLODIPINE BESYLATE 5 MG/1
5 TABLET ORAL DAILY
Status: DISCONTINUED | OUTPATIENT
Start: 2019-08-02 | End: 2019-08-02

## 2019-08-01 RX ORDER — FUROSEMIDE 40 MG/1
40 TABLET ORAL ONCE
Status: COMPLETED | OUTPATIENT
Start: 2019-08-01 | End: 2019-08-01

## 2019-08-01 RX ORDER — POTASSIUM CHLORIDE 20 MEQ/1
20 TABLET, EXTENDED RELEASE ORAL 2 TIMES DAILY WITH MEALS
Status: DISCONTINUED | OUTPATIENT
Start: 2019-08-01 | End: 2019-08-02 | Stop reason: HOSPADM

## 2019-08-01 RX ORDER — ATORVASTATIN CALCIUM 10 MG/1
10 TABLET, FILM COATED ORAL NIGHTLY
Status: DISCONTINUED | OUTPATIENT
Start: 2019-08-01 | End: 2019-08-02 | Stop reason: HOSPADM

## 2019-08-01 RX ORDER — FUROSEMIDE 40 MG/1
40 TABLET ORAL DAILY
Status: DISCONTINUED | OUTPATIENT
Start: 2019-08-02 | End: 2019-08-02 | Stop reason: HOSPADM

## 2019-08-01 RX ORDER — LABETALOL 300 MG/1
300 TABLET, FILM COATED ORAL EVERY 12 HOURS SCHEDULED
Qty: 60 TABLET | Refills: 3 | Status: CANCELLED | OUTPATIENT
Start: 2019-08-01

## 2019-08-01 RX ADMIN — BUPROPION HYDROCHLORIDE 150 MG: 150 TABLET, FILM COATED, EXTENDED RELEASE ORAL at 11:30

## 2019-08-01 RX ADMIN — ACETAMINOPHEN 1000 MG: 10 INJECTION, SOLUTION INTRAVENOUS at 03:04

## 2019-08-01 RX ADMIN — POTASSIUM CHLORIDE 20 MEQ: 1500 TABLET, EXTENDED RELEASE ORAL at 11:30

## 2019-08-01 RX ADMIN — Medication 10 ML: at 22:29

## 2019-08-01 RX ADMIN — OXYCODONE HYDROCHLORIDE 10 MG: 5 TABLET ORAL at 08:32

## 2019-08-01 RX ADMIN — CIPROFLOXACIN 400 MG: 2 INJECTION, SOLUTION INTRAVENOUS at 01:35

## 2019-08-01 RX ADMIN — POTASSIUM CHLORIDE 20 MEQ: 1500 TABLET, EXTENDED RELEASE ORAL at 18:06

## 2019-08-01 RX ADMIN — PANTOPRAZOLE SODIUM 40 MG: 40 INJECTION, POWDER, FOR SOLUTION INTRAVENOUS at 08:33

## 2019-08-01 RX ADMIN — METRONIDAZOLE 500 MG: 500 INJECTION, SOLUTION INTRAVENOUS at 09:44

## 2019-08-01 RX ADMIN — OXYCODONE HYDROCHLORIDE 5 MG: 5 TABLET ORAL at 15:10

## 2019-08-01 RX ADMIN — FUROSEMIDE 20 MG: 20 TABLET ORAL at 08:32

## 2019-08-01 RX ADMIN — METOCLOPRAMIDE 10 MG: 5 INJECTION, SOLUTION INTRAMUSCULAR; INTRAVENOUS at 18:06

## 2019-08-01 RX ADMIN — CIPROFLOXACIN 400 MG: 2 INJECTION, SOLUTION INTRAVENOUS at 15:10

## 2019-08-01 RX ADMIN — ENOXAPARIN SODIUM 40 MG: 40 INJECTION SUBCUTANEOUS at 08:33

## 2019-08-01 RX ADMIN — METRONIDAZOLE 500 MG: 500 INJECTION, SOLUTION INTRAVENOUS at 01:36

## 2019-08-01 RX ADMIN — Medication 10 ML: at 08:35

## 2019-08-01 RX ADMIN — METOCLOPRAMIDE 10 MG: 5 INJECTION, SOLUTION INTRAMUSCULAR; INTRAVENOUS at 11:31

## 2019-08-01 RX ADMIN — DULOXETINE HYDROCHLORIDE 60 MG: 60 CAPSULE, DELAYED RELEASE ORAL at 11:30

## 2019-08-01 RX ADMIN — ATORVASTATIN CALCIUM 10 MG: 10 TABLET, FILM COATED ORAL at 22:26

## 2019-08-01 RX ADMIN — METOPROLOL SUCCINATE 100 MG: 50 TABLET, FILM COATED, EXTENDED RELEASE ORAL at 08:33

## 2019-08-01 RX ADMIN — LABETALOL HYDROCHLORIDE 300 MG: 200 TABLET, FILM COATED ORAL at 11:30

## 2019-08-01 RX ADMIN — OXYCODONE HYDROCHLORIDE 5 MG: 5 TABLET ORAL at 22:26

## 2019-08-01 RX ADMIN — HYDRALAZINE HYDROCHLORIDE 20 MG: 20 INJECTION INTRAMUSCULAR; INTRAVENOUS at 00:12

## 2019-08-01 RX ADMIN — KETOROLAC TROMETHAMINE 30 MG: 30 INJECTION, SOLUTION INTRAMUSCULAR; INTRAVENOUS at 06:37

## 2019-08-01 RX ADMIN — METOCLOPRAMIDE 10 MG: 5 INJECTION, SOLUTION INTRAMUSCULAR; INTRAVENOUS at 22:26

## 2019-08-01 RX ADMIN — HYDRALAZINE HYDROCHLORIDE 20 MG: 20 INJECTION INTRAMUSCULAR; INTRAVENOUS at 06:27

## 2019-08-01 RX ADMIN — Medication 10 ML: at 08:34

## 2019-08-01 RX ADMIN — AMLODIPINE BESYLATE 10 MG: 5 TABLET ORAL at 08:33

## 2019-08-01 RX ADMIN — LISINOPRIL 40 MG: 20 TABLET ORAL at 08:33

## 2019-08-01 RX ADMIN — LABETALOL HYDROCHLORIDE 300 MG: 200 TABLET, FILM COATED ORAL at 22:25

## 2019-08-01 RX ADMIN — METOCLOPRAMIDE 10 MG: 5 INJECTION, SOLUTION INTRAMUSCULAR; INTRAVENOUS at 05:05

## 2019-08-01 RX ADMIN — METRONIDAZOLE 500 MG: 500 INJECTION, SOLUTION INTRAVENOUS at 18:07

## 2019-08-01 RX ADMIN — FUROSEMIDE 40 MG: 40 TABLET ORAL at 15:10

## 2019-08-01 ASSESSMENT — PAIN DESCRIPTION - PAIN TYPE
TYPE: SURGICAL PAIN

## 2019-08-01 ASSESSMENT — PAIN DESCRIPTION - FREQUENCY
FREQUENCY: INTERMITTENT
FREQUENCY: INTERMITTENT

## 2019-08-01 ASSESSMENT — PAIN SCALES - GENERAL
PAINLEVEL_OUTOF10: 3
PAINLEVEL_OUTOF10: 0
PAINLEVEL_OUTOF10: 4
PAINLEVEL_OUTOF10: 6
PAINLEVEL_OUTOF10: 4
PAINLEVEL_OUTOF10: 5
PAINLEVEL_OUTOF10: 0
PAINLEVEL_OUTOF10: 3

## 2019-08-01 ASSESSMENT — PAIN DESCRIPTION - DESCRIPTORS
DESCRIPTORS: CRAMPING;DISCOMFORT
DESCRIPTORS: DISCOMFORT;ACHING
DESCRIPTORS: ACHING;DISCOMFORT
DESCRIPTORS: DISCOMFORT
DESCRIPTORS: DISCOMFORT

## 2019-08-01 ASSESSMENT — PAIN DESCRIPTION - LOCATION
LOCATION: ABDOMEN
LOCATION: ABDOMEN;INCISION
LOCATION: ABDOMEN
LOCATION: ABDOMEN;INCISION
LOCATION: ABDOMEN;INCISION

## 2019-08-01 ASSESSMENT — PAIN DESCRIPTION - PROGRESSION: CLINICAL_PROGRESSION: GRADUALLY WORSENING

## 2019-08-01 ASSESSMENT — PAIN DESCRIPTION - ORIENTATION
ORIENTATION: LOWER
ORIENTATION: MID;LOWER
ORIENTATION: MID;LOWER
ORIENTATION: LOWER;MID

## 2019-08-01 ASSESSMENT — PAIN DESCRIPTION - ONSET: ONSET: GRADUAL

## 2019-08-01 NOTE — PROGRESS NOTES
Pt returned from cardio per transportation.  Electronically signed by Mukesh Liu RN on 8/1/2019 at 3:02 PM

## 2019-08-01 NOTE — PROGRESS NOTES
ABDOMEN PELVIS W IV CONTRAST Additional Contrast? None   Final Result   Findings compatible with small bowel obstruction with transition point at the   terminal ileum/ileocecal valve. Fluid fills essentially the entirety of the   small bowel and superimposed nonspecific enteritis not excluded. Likely chronic inflammatory change involving large bowel along with mild   lipomatosis at the ileocecal valve, similar to prior CT exam 11/01/2016. Mild ascites, likely reactive. Problem List  Principal Problem:    SBO (small bowel obstruction) ([facility])  Active Problems:    Hypertension    Depression    GERD (gastroesophageal reflux disease)    DM2 (diabetes mellitus, type 2) ([facility])    UTI (urinary tract infection)    Postoperative respiratory failure ([facility])  Resolved Problems:    * No resolved hospital problems. *       Assessment & Plan:   SBO. Status Post multiple abdominal surgeries in the past and had surgery done today with EXPLORATORY LAPAROTOMY, LYSIS OF ADHESIONS, POSSIBLE BOWEL RESECTION team Cipro and Flagyl. NG tube removed currently on soft diet. Intubated postop. Currently extubated    Diabetes  mellitus controlled we will give sliding scale      blood pressure controlled.   Dose of Lopressor increased sinus tachycardia under control heart rate less than 100 surgery consult cardiology for sinus tachycardia    IV fluids and potassium supplementation hypokalemia give K rider  Continue TPN or PPN as per surgery    Blood cultures obtained patient developed tachycardia no shortness of breath saturating well on room air cultures negative so far    Dose of Lasix increased    Patient GI follow-up Crohn's disease as biopsy came positive for Crohn's    Postop respiratory failure-delayed recovery from anesthesia  Diet: DIET GENERAL;  Code:Full Code  DVT PPX lovenox  Disposition tomorrow      Rosenda Mccain MD   8/1/2019 1:46 PM

## 2019-08-01 NOTE — CONSULTS
[facility]   Cardiac Evaluation      Patient: Natalie Zimmer  YOB: 1970         Chief Complaint   Patient presents with    Abdominal Pain     Pt to ER reporting diffuse abdominal pain through center of abdomen x 2 days, states she has nausea without vomiting. Denies diarrhea or urinary sx - pt reports hx of \"severe\" constipation        Referring provider: Dariusz Dean MD    History of Present Illness:   52 yo AAF referred for sinus tachycardia. She has a hx of htn and has had surgery for a small bowel obstruction which has been found to be the result of Crohn's disease. She was off her BB for a few days because she was NPO. She also has been off her 2 usual antidepressants for the same reason. She has had a great deal of IVfluids but is not in chf. No previous cardiac studies. No hx of CAD or angina and no FH of early cardiac problems. No AF or other arrhythmia other than the sinus tach. Past Medical History:   has a past medical history of Anemia, Anxiety, Community acquired pneumonia, Constipation, Crohn's disease ([address] Depression, Diabetes mellitus ([address] GERD (gastroesophageal reflux disease), Hypertension, and Reflux. Surgical History:   has a past surgical history that includes  section; Hemorrhoid surgery; Appendectomy; feng and bso (cervix removed) (2010); Hysterectomy; and Small intestine surgery (N/A, 2019).      Current Facility-Administered Medications   Medication Dose Route Frequency Provider Last Rate Last Dose    oxyCODONE (ROXICODONE) immediate release tablet 5 mg  5 mg Oral Q4H PRN Pita Ross MD        Or    oxyCODONE (ROXICODONE) immediate release tablet 10 mg  10 mg Oral Q4H PRN Pita Ross MD   10 mg at 19 [id number]    perflutren lipid microspheres (DEFINITY) injection 1.65 mg  1.5 mL Intravenous ONCE PRN Leward Goodpasture, MD        DULoxetine (CYMBALTA) extended release capsule 60 mg  60 mg Oral Daily Rowan Farias MD   60 mg at 08/01/19 1130    buPROPion (WELLBUTRIN XL) extended release tablet 150 mg  150 mg Oral Daily Rowan Farias MD   150 mg at 08/01/19 1130    atorvastatin (LIPITOR) tablet 10 mg  10 mg Oral Nightly Rowan Farias MD        labetalol (NORMODYNE) tablet 300 mg  300 mg Oral 2 times per day Rowan Farias MD   300 mg at 08/01/19 1130    [START ON 8/2/2019] amLODIPine (NORVASC) tablet 5 mg  5 mg Oral Daily Rowan Farias MD       [facility] [START ON 8/2/2019] furosemide (LASIX) tablet 40 mg  40 mg Oral Daily Rowan Farias MD        furosemide (LASIX) tablet 40 mg  40 mg Oral Once Rowan Farias MD        potassium chloride (KLOR-CON M) extended release tablet 20 mEq  20 mEq Oral BID  Benson Otero MD   20 mEq at 08/01/19 1130    lidocaine PF 1 % injection 5 mL  5 mL Intradermal Once Golden Alves MD        sodium chloride flush 0.9 % injection 10 mL  10 mL Intravenous 2 times per day Golden Alves MD   10 mL at 08/01/19 0834    sodium chloride flush 0.9 % injection 10 mL  10 mL Intravenous PRN Golden Alves MD        iopamidol (ISOVUE-370) 76 % injection 75 mL  75 mL Intravenous ONCE PRN Golden Alves MD        labetalol (NORMODYNE;TRANDATE) injection 20 mg  20 mg Intravenous Q4H PRN Feliz Caro MD   20 mg at 07/30/19 1148    niCARdipine (CARDENE) 20 mg in 0.9 % sodium chloride 200 mL infusion  5 mg/hr Intravenous Continuous Feliz Caro MD   Stopped at 07/31/19 1733    bisacodyl (DULCOLAX) suppository 10 mg  10 mg Rectal Daily PRN Golden Alves MD        potassium chloride 10 mEq/100 mL IVPB (Peripheral Line)  10 mEq Intravenous PRN Karrie Corona  mL/hr at 07/31/19 1515 10 mEq at 07/31/19 1515    metronidazole (FLAGYL) 500 mg in NaCl 100 mL IVPB premix  500 mg Intravenous Q8H Mattie Rivas MD   Stopped at 08/01/19 1045    metoclopramide (REGLAN) injection 10 mg  10 mg Intravenous Q6H Luis Miguel Serve BP Readings from Last 3 Encounters:   08/01/19 131/76   07/26/19 (!) 202/106   06/13/17 (!) 153/91        Physical Examination:    · CONSTITUTIONAL: Well developed, well nourished  · EYES: PERRLA. No xanthelasma, sclera non icteric  · EARS,NOSE,MOUTH,THROAT:  Mucous membranes moist, normal hearing  · NECK: Supple, JVP normal, thyroid not enlarged. Carotids 2+ without bruits  · RESPIRATORY: Normal effort, no rales or rhonchi  · CARDIOVASCULAR: Normal PMI, regular rate and rhythm, no murmurs, rub or gallop. No edema. Radial pulses present and equal  · CHEST: No scar or masses  · ABDOMEN:in a binder with a large bandaged incision  · MUSCULOSKELETAL: No clubbing or cyanosis. Moves all extremities well. Normal gait  · SKIN:  Warm and dry. No rashes  · NEUROLOGIC: Cranial nerves intact. Alert and oriented  · PSYCHIATRIC: Calm affect. Appears to have normal judgement and insight        Assessment/Plan  1. SBO (small bowel obstruction) (HCC) at ileocecal junction now with resection and path consistent with Crohn's disease   2. Hypertension - for years and she was chronically on BB until becoming NPO for this illness and its treatment. No indication that this is anything other than essential hypertension    3. Sinus tachycardia - most likely as the result of her illness and rebound effects of being off BB. The metoprolol was restarted yesterday. 4. Depression - she has been off her meds    I do not think there is any organic heart problem; I will get an echo to check. I will restart her depression meds and change the metoprolol to labetalol. She has received a great deal of IV fluids and this is also contributing. She is now on lasix. Thank you for allowing to me to participate in the care of Сергей Champion.

## 2019-08-02 VITALS
SYSTOLIC BLOOD PRESSURE: 131 MMHG | TEMPERATURE: 97.6 F | WEIGHT: 211.42 LBS | BODY MASS INDEX: 37.46 KG/M2 | OXYGEN SATURATION: 93 % | RESPIRATION RATE: 16 BRPM | HEIGHT: 63 IN | HEART RATE: 90 BPM | DIASTOLIC BLOOD PRESSURE: 82 MMHG

## 2019-08-02 LAB
GLUCOSE BLD-MCNC: 135 MG/DL (ref 70–99)
GLUCOSE BLD-MCNC: 147 MG/DL (ref 70–99)
PERFORMED ON: ABNORMAL
PERFORMED ON: ABNORMAL

## 2019-08-02 PROCEDURE — 6370000000 HC RX 637 (ALT 250 FOR IP): Performed by: INTERNAL MEDICINE

## 2019-08-02 PROCEDURE — C9113 INJ PANTOPRAZOLE SODIUM, VIA: HCPCS | Performed by: SURGERY

## 2019-08-02 PROCEDURE — 99231 SBSQ HOSP IP/OBS SF/LOW 25: CPT | Performed by: INTERNAL MEDICINE

## 2019-08-02 PROCEDURE — 2500000003 HC RX 250 WO HCPCS: Performed by: INTERNAL MEDICINE

## 2019-08-02 PROCEDURE — 6360000002 HC RX W HCPCS: Performed by: SURGERY

## 2019-08-02 PROCEDURE — 99024 POSTOP FOLLOW-UP VISIT: CPT | Performed by: SURGERY

## 2019-08-02 PROCEDURE — 94761 N-INVAS EAR/PLS OXIMETRY MLT: CPT

## 2019-08-02 PROCEDURE — 6370000000 HC RX 637 (ALT 250 FOR IP): Performed by: SURGERY

## 2019-08-02 PROCEDURE — 2580000003 HC RX 258: Performed by: SURGERY

## 2019-08-02 RX ORDER — OXYCODONE HYDROCHLORIDE 5 MG/1
5 TABLET ORAL EVERY 4 HOURS PRN
Qty: 15 TABLET | Refills: 0 | Status: SHIPPED | OUTPATIENT
Start: 2019-08-02 | End: 2019-08-05

## 2019-08-02 RX ORDER — METRONIDAZOLE 500 MG/1
500 TABLET ORAL 3 TIMES DAILY
Qty: 6 TABLET | Refills: 0 | Status: SHIPPED | OUTPATIENT
Start: 2019-08-02 | End: 2019-08-04

## 2019-08-02 RX ORDER — LABETALOL 200 MG/1
200 TABLET, FILM COATED ORAL EVERY 12 HOURS SCHEDULED
Qty: 60 TABLET | Refills: 3 | Status: SHIPPED | OUTPATIENT
Start: 2019-08-02

## 2019-08-02 RX ORDER — LABETALOL 200 MG/1
200 TABLET, FILM COATED ORAL EVERY 12 HOURS SCHEDULED
Status: DISCONTINUED | OUTPATIENT
Start: 2019-08-02 | End: 2019-08-02 | Stop reason: HOSPADM

## 2019-08-02 RX ORDER — FUROSEMIDE 40 MG/1
40 TABLET ORAL DAILY
Qty: 60 TABLET | Refills: 0 | Status: SHIPPED | OUTPATIENT
Start: 2019-08-03

## 2019-08-02 RX ORDER — LEVOFLOXACIN 500 MG/1
500 TABLET, FILM COATED ORAL DAILY
Qty: 2 TABLET | Refills: 0 | Status: SHIPPED | OUTPATIENT
Start: 2019-08-02 | End: 2019-08-04

## 2019-08-02 RX ADMIN — CIPROFLOXACIN 400 MG: 2 INJECTION, SOLUTION INTRAVENOUS at 13:44

## 2019-08-02 RX ADMIN — DULOXETINE HYDROCHLORIDE 60 MG: 60 CAPSULE, DELAYED RELEASE ORAL at 09:01

## 2019-08-02 RX ADMIN — OXYCODONE HYDROCHLORIDE 10 MG: 5 TABLET ORAL at 16:55

## 2019-08-02 RX ADMIN — BUPROPION HYDROCHLORIDE 150 MG: 150 TABLET, FILM COATED, EXTENDED RELEASE ORAL at 09:01

## 2019-08-02 RX ADMIN — Medication 10 ML: at 09:04

## 2019-08-02 RX ADMIN — PANTOPRAZOLE SODIUM 40 MG: 40 INJECTION, POWDER, FOR SOLUTION INTRAVENOUS at 09:01

## 2019-08-02 RX ADMIN — LABETALOL HYDROCHLORIDE 200 MG: 200 TABLET, FILM COATED ORAL at 09:01

## 2019-08-02 RX ADMIN — LISINOPRIL 40 MG: 20 TABLET ORAL at 09:01

## 2019-08-02 RX ADMIN — FUROSEMIDE 40 MG: 40 TABLET ORAL at 09:01

## 2019-08-02 RX ADMIN — Medication 10 ML: at 09:03

## 2019-08-02 RX ADMIN — OXYCODONE HYDROCHLORIDE 10 MG: 5 TABLET ORAL at 13:50

## 2019-08-02 RX ADMIN — METRONIDAZOLE 500 MG: 500 INJECTION, SOLUTION INTRAVENOUS at 02:47

## 2019-08-02 RX ADMIN — CIPROFLOXACIN 400 MG: 2 INJECTION, SOLUTION INTRAVENOUS at 04:08

## 2019-08-02 RX ADMIN — POTASSIUM CHLORIDE 20 MEQ: 1500 TABLET, EXTENDED RELEASE ORAL at 09:01

## 2019-08-02 RX ADMIN — METRONIDAZOLE 500 MG: 500 INJECTION, SOLUTION INTRAVENOUS at 09:01

## 2019-08-02 RX ADMIN — ENOXAPARIN SODIUM 40 MG: 40 INJECTION SUBCUTANEOUS at 09:02

## 2019-08-02 RX ADMIN — METOCLOPRAMIDE 10 MG: 5 INJECTION, SOLUTION INTRAMUSCULAR; INTRAVENOUS at 06:43

## 2019-08-02 ASSESSMENT — PAIN SCALES - GENERAL
PAINLEVEL_OUTOF10: 0
PAINLEVEL_OUTOF10: 6
PAINLEVEL_OUTOF10: 6
PAINLEVEL_OUTOF10: 3

## 2019-08-02 NOTE — PROGRESS NOTES
Nutrition Assessment    Type and Reason for Visit: Reassess    Nutrition Recommendations:   1. CHO controlled- 4, Low fiber  2. Encourage po    Nutrition Assessment: Pt s/pr exploratory lap with lysis of adhesions and a ileo-cecal resection. Pt now on a general diet and tolerating it. Appetite is slowly coming back. PO varies. Pt denies any n/v. Pt does not follow any special diet at home. Pt denies having been educated on a dm diet. Also asked about Crohns disease diet as well. RD provided handouts for those disease states and also included a low fiber diet d/t pt being s/p resection. Pt accepted handouts, but then declined explanation. Pt stated that she would rather just read the information. Pt remains nutritionally compromised d/t poor appetite and increased nutrient needs for wound healing. Malnutrition Assessment:  · Malnutrition Status: No malnutrition    Nutrition Risk Level:  Moderate    Nutrient Needs:  · Estimated Daily Total Kcal: 6419-7981  · Estimated Daily Protein (g):  gms(1.5-2.0 gms)  · Estimated Daily Total Fluid (ml/day): 1 ml/kcal    Nutrition Diagnosis:   · Problem: Inadequate energy intake  · Etiology: related to Insufficient energy/nutrient consumption     Signs and symptoms:  as evidenced by (poor po)    Objective Information:  · Nutrition-Focused Physical Findings: NS @ 75 mL/hr; hypoactive BS  · Wound Type: Surgical Wound  · Current Nutrition Therapies:  · Oral Diet Orders: General   · Oral Diet intake: 26-50%, 51-75%, 1-25%  · Oral Nutrition Supplement (ONS) Orders: None  · ONS intake: NPO  · Anthropometric Measures:  · Ht: 5' 2.5\" (158.8 cm)   · Current Body Wt: 211 lb (95.7 kg)  · Ideal Body Wt: 112 lb (50.8 kg), % Ideal Body    · BMI Classification: BMI 35.0 - 39.9 Obese Class II    Nutrition Interventions:   Modify current diet  Continued Inpatient Monitoring, Education Completed(Low fiber, IBD, and DM)    Nutrition Evaluation:   · Evaluation: Goal achieved   · Goals: New goal- PO 50% at meals    · Monitoring: Meal Intake, Weight, I&O, Diet Tolerance      Electronically signed by Marita Dover RD, CNSC, LD on 8/2/19 at 9:33 AM    Contact Number: [phone]

## 2019-08-02 NOTE — DISCHARGE SUMMARY
the right colon which indicates incomplete   obstruction. The NG tube is in adequate position. XR ABDOMEN (2 VIEWS)   Final Result   Mildly dilated gas-filled small bowel loops containing air-fluid levels,   consistent with patient's known distal small bowel obstruction. No free air. XR ABDOMEN FOR NG/OG/NE TUBE PLACEMENT   Final Result   Status post placement of nasogastric tube with distal tip located within the   stomach as described above. CT ABDOMEN PELVIS W IV CONTRAST Additional Contrast? None   Final Result   Findings compatible with small bowel obstruction with transition point at the   terminal ileum/ileocecal valve. Fluid fills essentially the entirety of the   small bowel and superimposed nonspecific enteritis not excluded. Likely chronic inflammatory change involving large bowel along with mild   lipomatosis at the ileocecal valve, similar to prior CT exam 11/01/2016. Mild ascites, likely reactive. Invasive procedures and treatments. 1. None     Problem-based Hospital Course. SBO. Status Post multiple abdominal surgeries in the past and had surgery done today with EXPLORATORY LAPAROTOMY, LYSIS OF ADHESIONS, POSSIBLE BOWEL RESECTION team Cipro and Flagyl next 2 days. NG tube removed currently on soft diet.     Intubated postop. Currently extubated     Diabetes  mellitus controlled . Continue oral hypoglycemic agent      blood pressure controlled. Labetalol added    Dose of Lasix increased     Patient GI follow-up Crohn's disease as biopsy came positive for Crohn's      Consults. IP CONSULT TO GENERAL SURGERY  IP CONSULT TO HOSPITALIST  IP CONSULT TO CARDIOLOGY    Physical examination on discharge day. /82   Pulse 90   Temp 97.6 °F (36.4 °C) (Temporal)   Resp 16   Ht 5' 2.5\" (1.588 m)   Wt 211 lb 6.7 oz (95.9 kg)   LMP 12/02/2010   SpO2 98%   BMI 38.05 kg/m²   General appearance. Alert. Looks comfortable. HEENT.  Sclera Where to Get Your Medications      These medications were sent to [address] [place] [address]    Hours:  24-hours Phone:  [phone]   · furosemide 40 MG tablet  · labetalol 200 MG tablet  · levofloxacin 500 MG tablet  · metroNIDAZOLE 500 MG tablet     You can get these medications from any pharmacy    Bring a paper prescription for each of these medications  · oxyCODONE 5 MG immediate release tablet         Discharge recommendations given to patient. Follow Up. PCP, GI  in 1 week   Disposition. home  Activity. activity as tolerated  Diet: DIET GENERAL;      Spent 36 minutes in discharge process.     Brennan Locke MD     8/2/2019 2:01 PM

## 2019-08-02 NOTE — PLAN OF CARE
Nutrition Problem: Inadequate energy intake  Intervention: Food and/or Nutrient Delivery: Modify current diet  Nutritional Goals: New goal- PO 50% at meals

## 2019-08-02 NOTE — PROGRESS NOTES
(DULCOLAX) suppository 10 mg  10 mg Rectal Daily PRN Violeta Adam MD        potassium chloride 10 mEq/100 mL IVPB (Peripheral Line)  10 mEq Intravenous PRN Karrie Corona  mL/hr at 07/31/19 1515 10 mEq at 07/31/19 1515    metronidazole (FLAGYL) 500 mg in NaCl 100 mL IVPB premix  500 mg Intravenous Q8H Alireza Stacy MD   Stopped at 08/02/19 1015    metoclopramide (REGLAN) injection 10 mg  10 mg Intravenous Q6H Violeta Adam MD   10 mg at 08/02/19 0502    hydrALAZINE (APRESOLINE) injection 20 mg  20 mg Intravenous Q6H PRN Miesha Jacobsen MD   20 mg at 08/01/19 3499    HYDROmorphone HCl PF (DILAUDID) injection 0.5 mg  0.5 mg Intravenous Q2H PRN Ariane Voss MD   0.5 mg at 07/31/19 0502    lisinopril (PRINIVIL;ZESTRIL) tablet 40 mg  40 mg Oral Daily Miesha Jacobsen MD   40 mg at 08/02/19 0901    metoprolol (LOPRESSOR) injection 5 mg  5 mg Intravenous Q6H PRN Ariane Voss MD   5 mg at 07/31/19 0503    propofol injection  10 mcg/kg/min Intravenous Titrated Kayla Jacobson MD   Stopped at 07/27/19 0803    fentaNYL (SUBLIMAZE) 1,000 mcg in sodium chloride 0.9 % 100 mL infusion  1 mcg/kg/hr Intravenous Continuous Kayla Jacobson MD   Stopped at 07/27/19 0803    insulin lispro (HUMALOG) injection pen 0-6 Units  0-6 Units Subcutaneous TID WC Maegan Perez MD   Stopped at 07/28/19 1316    insulin lispro (HUMALOG) injection pen 0-3 Units  0-3 Units Subcutaneous Nightly Maegan Perez MD   1 Units at 07/31/19 2058    glucose (GLUTOSE) 40 % oral gel 15 g  15 g Oral PRN Benson Perdomo MD        dextrose 50 % IV solution  12.5 g Intravenous PRN Benson P Evelyn Perdomo MD        glucagon (rDNA) injection 1 mg  1 mg Intramuscular PRN Maegan Perez MD        dextrose 5 % solution  100 mL/hr Intravenous PRN Maegan Perez MD        morphine injection 4 mg  4 mg Intravenous Q3H PRN Violeta Adam MD   4 mg at 07/31/19 1120    prochlorperazine (COMPAZINE) injection 10 mg  10 mg Intravenous Q6H PRN Pita Ross MD   10 mg at 07/31/19 0326    iohexol (OMNIPAQUE 240) injection 150 mL  150 mL Oral ONCE PRN Pita Ross MD        sodium chloride flush 0.9 % injection 10 mL  10 mL Intravenous 2 times per day Pita Ross MD   10 mL at 08/02/19 0904    sodium chloride flush 0.9 % injection 10 mL  10 mL Intravenous PRN Pita Ross MD   10 mL at 07/30/19 1743    magnesium hydroxide (MILK OF MAGNESIA) 400 MG/5ML suspension 30 mL  30 mL Oral Daily PRN Pita Ross MD        ondansetron [facility]) injection 4 mg  4 mg Intravenous Q6H PRN Pita Ross MD   4 mg at 07/26/19 0855    enoxaparin (LOVENOX) injection 40 mg  40 mg Subcutaneous Daily Pita Ross MD   40 mg at 08/02/19 0902    ciprofloxacin (CIPRO) IVPB 400 mg  400 mg Intravenous Q12H Pita Ross  mL/hr at 08/02/19 1344 400 mg at 08/02/19 1344    pantoprazole (PROTONIX) injection 40 mg  40 mg Intravenous Daily Pita Ross MD   40 mg at 08/02/19 0901     Allergies   Allergen Reactions    Penicillins Swelling    Clindamycin/Lincomycin Rash    Sulfa Antibiotics Rash     Principal Problem:    SBO (small bowel obstruction) ([facility])  Active Problems:    Hypertension    Depression    GERD (gastroesophageal reflux disease)    DM2 (diabetes mellitus, type 2) ([facility]    UTI (urinary tract infection)    Postoperative respiratory failure ([facility]  Resolved Problems:    * No resolved hospital problems. *    Blood pressure 131/82, pulse 90, temperature 97.6 °F (36.4 °C), temperature source Temporal, resp. rate 16, height 5' 2.5\" (1.588 m), weight 211 lb 6.7 oz (95.9 kg), last menstrual period 12/02/2010, SpO2 98 %. Subjective:  Symptoms:  Stable. Diet:  Adequate intake. Activity level: Normal.    Pain:  She complains of pain that is mild. Objective:  General Appearance:  Comfortable.     Vital signs: (most recent): Blood pressure

## 2019-08-02 NOTE — PROGRESS NOTES
Minimal / Scant shadowing on dressing and unchanged from earlier in shift.     VSS   NSR on monitor  SBP remains around 100's  Will continue to monitor

## 2019-08-02 NOTE — PLAN OF CARE
Problem: Falls - Risk of:  Goal: Will remain free from falls  Description  Will remain free from falls  Outcome: Ongoing     Problem: Pain:  Goal: Control of acute pain  Description  Control of acute pain  Outcome: Ongoing

## 2019-08-03 LAB
GRAM STAIN RESULT: ABNORMAL
ORGANISM: ABNORMAL
ORGANISM: ABNORMAL
WOUND/ABSCESS: ABNORMAL

## 2019-08-05 LAB
BLOOD CULTURE, ROUTINE: NORMAL
CULTURE, BLOOD 2: NORMAL

## 2019-08-09 ENCOUNTER — OFFICE VISIT (OUTPATIENT)
Dept: SURGERY | Age: 49
End: 2019-08-09

## 2019-08-09 VITALS — WEIGHT: 199 LBS | SYSTOLIC BLOOD PRESSURE: 122 MMHG | DIASTOLIC BLOOD PRESSURE: 86 MMHG | BODY MASS INDEX: 35.82 KG/M2

## 2019-08-09 DIAGNOSIS — K50.012 CROHN'S DISEASE OF SMALL INTESTINE WITH INTESTINAL OBSTRUCTION (HCC): Primary | ICD-10-CM

## 2019-08-09 PROCEDURE — 99024 POSTOP FOLLOW-UP VISIT: CPT | Performed by: SURGERY

## 2019-08-09 RX ORDER — OXYCODONE HYDROCHLORIDE AND ACETAMINOPHEN 5; 325 MG/1; MG/1
1-2 TABLET ORAL EVERY 4 HOURS PRN
Qty: 20 TABLET | Refills: 0 | Status: SHIPPED | OUTPATIENT
Start: 2019-08-09 | End: 2019-08-12

## 2019-08-23 ENCOUNTER — OFFICE VISIT (OUTPATIENT)
Dept: SURGERY | Age: 49
End: 2019-08-23

## 2019-08-23 VITALS — WEIGHT: 201 LBS | BODY MASS INDEX: 36.18 KG/M2 | DIASTOLIC BLOOD PRESSURE: 92 MMHG | SYSTOLIC BLOOD PRESSURE: 152 MMHG

## 2019-08-23 DIAGNOSIS — K50.012 CROHN'S DISEASE OF SMALL INTESTINE WITH INTESTINAL OBSTRUCTION (HCC): Primary | ICD-10-CM

## 2019-08-23 PROBLEM — N39.0 UTI (URINARY TRACT INFECTION): Status: RESOLVED | Noted: 2019-07-24 | Resolved: 2019-08-23

## 2019-08-23 PROCEDURE — 99024 POSTOP FOLLOW-UP VISIT: CPT | Performed by: SURGERY

## 2019-08-23 NOTE — PROGRESS NOTES
Subjective:      Patient ID: Hebert Jewell is a 53 y.o. female. HPI    Review of Systems    Objective:   Physical Exam  Abdomen soft  Incision healed except for pinpoint opening superiorly  Assessment:      67-year-old female status post ileocolonic resection for a small bowel obstruction related to Crohn's disease. Doing well postoperatively. She still has a pinpoint opening at the upper aspect of her incision. This should continue to heal without a problem. Plan:      Follow-up with me as needed. She has already has arranged forGI follow-up.         Nico Cervantes MD

## 2019-08-23 NOTE — COMMUNICATION BODY
Assessment:  28-year-old female status post ileocolonic resection for a small bowel obstruction related to Crohn's disease. Doing well postoperatively. She still has a pinpoint opening at the upper aspect of her incision. This should continue to heal without a problem. Plan:  Follow-up with me as needed. She has already has arranged forGI follow-up.

## 2019-08-30 ENCOUNTER — HOSPITAL ENCOUNTER (OUTPATIENT)
Age: 49
Discharge: HOME | End: 2019-08-30
Payer: COMMERCIAL

## 2019-08-30 LAB
A/G RATIO: 1.2 (ref 1.1–2.2)
ALBUMIN SERPL-MCNC: 4.8 G/DL (ref 3.4–5)
ALP BLD-CCNC: 110 U/L (ref 40–129)
ALT SERPL-CCNC: 18 U/L (ref 10–40)
ANION GAP SERPL CALCULATED.3IONS-SCNC: 19 MMOL/L (ref 3–16)
AST SERPL-CCNC: 22 U/L (ref 15–37)
BASOPHILS ABSOLUTE: 0 K/UL (ref 0–0.2)
BASOPHILS RELATIVE PERCENT: 0.5 %
BILIRUB SERPL-MCNC: 0.4 MG/DL (ref 0–1)
BUN BLDV-MCNC: 7 MG/DL (ref 7–20)
CALCIUM SERPL-MCNC: 10.2 MG/DL (ref 8.3–10.6)
CHLORIDE BLD-SCNC: 99 MMOL/L (ref 99–110)
CO2: 21 MMOL/L (ref 21–32)
CREAT SERPL-MCNC: 0.5 MG/DL (ref 0.6–1.1)
EOSINOPHILS ABSOLUTE: 0.2 K/UL (ref 0–0.6)
EOSINOPHILS RELATIVE PERCENT: 3.4 %
FOLATE: 9.93 NG/ML (ref 4.78–24.2)
GFR AFRICAN AMERICAN: >60
GFR NON-AFRICAN AMERICAN: >60
GLOBULIN: 4 G/DL
GLUCOSE BLD-MCNC: 123 MG/DL (ref 70–99)
HBV SURFACE AB TITR SER: 21.84 MIU/ML
HCT VFR BLD CALC: 29.1 % (ref 36–48)
HEMOGLOBIN: 9.7 G/DL (ref 12–16)
HEPATITIS B SURFACE ANTIGEN INTERPRETATION: NORMAL
LYMPHOCYTES ABSOLUTE: 1.4 K/UL (ref 1–5.1)
LYMPHOCYTES RELATIVE PERCENT: 22.5 %
MCH RBC QN AUTO: 26 PG (ref 26–34)
MCHC RBC AUTO-ENTMCNC: 33.3 G/DL (ref 31–36)
MCV RBC AUTO: 78 FL (ref 80–100)
MONOCYTES ABSOLUTE: 0.3 K/UL (ref 0–1.3)
MONOCYTES RELATIVE PERCENT: 5 %
NEUTROPHILS ABSOLUTE: 4.2 K/UL (ref 1.7–7.7)
NEUTROPHILS RELATIVE PERCENT: 68.6 %
PDW BLD-RTO: 14.7 % (ref 12.4–15.4)
PLATELET # BLD: 426 K/UL (ref 135–450)
PMV BLD AUTO: 7.3 FL (ref 5–10.5)
POTASSIUM SERPL-SCNC: 4 MMOL/L (ref 3.5–5.1)
RBC # BLD: 3.73 M/UL (ref 4–5.2)
SODIUM BLD-SCNC: 139 MMOL/L (ref 136–145)
TOTAL PROTEIN: 8.8 G/DL (ref 6.4–8.2)
VITAMIN B-12: 344 PG/ML (ref 211–911)
VITAMIN D 25-HYDROXY: 17.7 NG/ML
WBC # BLD: 6.1 K/UL (ref 4–11)

## 2019-08-30 PROCEDURE — 82657 ENZYME CELL ACTIVITY: CPT

## 2019-08-30 PROCEDURE — 82607 VITAMIN B-12: CPT

## 2019-08-30 PROCEDURE — 80053 COMPREHEN METABOLIC PANEL: CPT

## 2019-08-30 PROCEDURE — 87340 HEPATITIS B SURFACE AG IA: CPT

## 2019-08-30 PROCEDURE — 86706 HEP B SURFACE ANTIBODY: CPT

## 2019-08-30 PROCEDURE — 82306 VITAMIN D 25 HYDROXY: CPT

## 2019-08-30 PROCEDURE — 85025 COMPLETE CBC W/AUTO DIFF WBC: CPT

## 2019-08-30 PROCEDURE — 86480 TB TEST CELL IMMUN MEASURE: CPT

## 2019-08-30 PROCEDURE — 82746 ASSAY OF FOLIC ACID SERUM: CPT

## 2019-08-30 PROCEDURE — 36415 COLL VENOUS BLD VENIPUNCTURE: CPT

## 2019-09-02 LAB
MISCELLANEOUS LAB TEST ORDER: ABNORMAL
QUANTI TB GOLD PLUS: NEGATIVE
QUANTI TB1 MINUS NIL: 0 IU/ML (ref 0–0.34)
QUANTI TB2 MINUS NIL: 0 IU/ML (ref 0–0.34)
QUANTIFERON MITOGEN: 8.31 IU/ML
QUANTIFERON NIL: 0.01 IU/ML

## 2019-09-16 ENCOUNTER — TELEPHONE (OUTPATIENT)
Dept: SURGERY | Age: 49
End: 2019-09-16

## 2019-09-18 ENCOUNTER — TELEPHONE (OUTPATIENT)
Dept: SURGERY | Age: 49
End: 2019-09-18

## 2024-09-10 ENCOUNTER — APPOINTMENT (OUTPATIENT)
Dept: GENERAL RADIOLOGY | Age: 54
End: 2024-09-10
Attending: ANESTHESIOLOGY
Payer: COMMERCIAL

## 2024-09-10 ENCOUNTER — HOSPITAL ENCOUNTER (OUTPATIENT)
Age: 54
Setting detail: OUTPATIENT SURGERY
Discharge: HOME | End: 2024-09-10
Attending: ANESTHESIOLOGY | Admitting: ANESTHESIOLOGY
Payer: COMMERCIAL

## 2024-09-10 VITALS
DIASTOLIC BLOOD PRESSURE: 76 MMHG | HEIGHT: 63 IN | WEIGHT: 200 LBS | HEART RATE: 94 BPM | BODY MASS INDEX: 35.44 KG/M2 | SYSTOLIC BLOOD PRESSURE: 108 MMHG | TEMPERATURE: 96.8 F | RESPIRATION RATE: 16 BRPM | OXYGEN SATURATION: 99 %

## 2024-09-10 LAB
GLUCOSE BLD-MCNC: 114 MG/DL (ref 70–99)
PERFORMED ON: ABNORMAL

## 2024-09-10 PROCEDURE — 77003 FLUOROGUIDE FOR SPINE INJECT: CPT

## 2024-09-10 PROCEDURE — 2500000003 HC RX 250 WO HCPCS: Performed by: ANESTHESIOLOGY

## 2024-09-10 PROCEDURE — 6360000002 HC RX W HCPCS: Performed by: ANESTHESIOLOGY

## 2024-09-10 PROCEDURE — 3610000054 HC PAIN LEVEL 3 BASE (NON-OR): Performed by: ANESTHESIOLOGY

## 2024-09-10 PROCEDURE — 2709999900 HC NON-CHARGEABLE SUPPLY: Performed by: ANESTHESIOLOGY

## 2024-09-10 PROCEDURE — 99152 MOD SED SAME PHYS/QHP 5/>YRS: CPT | Performed by: ANESTHESIOLOGY

## 2024-09-10 RX ORDER — DEXAMETHASONE SODIUM PHOSPHATE 10 MG/ML
INJECTION, SOLUTION INTRAMUSCULAR; INTRAVENOUS
Status: COMPLETED | OUTPATIENT
Start: 2024-09-10 | End: 2024-09-10

## 2024-09-10 RX ORDER — MIDAZOLAM HYDROCHLORIDE 1 MG/ML
INJECTION INTRAMUSCULAR; INTRAVENOUS
Status: COMPLETED | OUTPATIENT
Start: 2024-09-10 | End: 2024-09-10

## 2024-09-10 RX ORDER — LIDOCAINE HYDROCHLORIDE 10 MG/ML
INJECTION, SOLUTION EPIDURAL; INFILTRATION; INTRACAUDAL; PERINEURAL
Status: COMPLETED | OUTPATIENT
Start: 2024-09-10 | End: 2024-09-10

## 2024-09-10 RX ORDER — FENTANYL CITRATE 50 UG/ML
INJECTION, SOLUTION INTRAMUSCULAR; INTRAVENOUS
Status: COMPLETED | OUTPATIENT
Start: 2024-09-10 | End: 2024-09-10

## 2024-09-10 ASSESSMENT — PAIN DESCRIPTION - DESCRIPTORS
DESCRIPTORS: OTHER (COMMENT)
DESCRIPTORS: SORE

## 2024-09-10 ASSESSMENT — PAIN - FUNCTIONAL ASSESSMENT
PAIN_FUNCTIONAL_ASSESSMENT: 0-10

## 2025-08-06 ENCOUNTER — TRANSCRIBE ORDERS (OUTPATIENT)
Dept: ADMINISTRATIVE | Age: 55
End: 2025-08-06

## 2025-08-06 DIAGNOSIS — K50.90 CROHN'S DISEASE WITHOUT COMPLICATION, UNSPECIFIED GASTROINTESTINAL TRACT LOCATION (HCC): Primary | ICD-10-CM

## (undated) DEVICE — SUTURE VCRL SZ 3-0 L18IN ABSRB UD W/O NDL POLYGLACTIN 910 J110T

## (undated) DEVICE — SUTURE PDS II SZ 1 L96IN ABSRB VLT TP-1 L65MM 1/2 CIR Z880G

## (undated) DEVICE — SOLUTION IV IRRIG WATER 1000ML POUR BRL 2F7114

## (undated) DEVICE — DRAPE,LAP,CHOLE,W/TROUGHS,STERILE: Brand: MEDLINE

## (undated) DEVICE — BLADE ES L6IN ELASTOMERIC COAT INSUL DURABLE BEND UPTO

## (undated) DEVICE — PEN: MARKING STD 100/CS: Brand: MEDICAL ACTION INDUSTRIES

## (undated) DEVICE — SUTURE VCRL SZ 3-0 L18IN ABSRB UD L26MM SH 1/2 CIR J864D

## (undated) DEVICE — SHEET,DRAPE,53X77,STERILE: Brand: MEDLINE

## (undated) DEVICE — STAPLER SKIN H3.9MM WIRE DIA0.58MM CRWN 6.9MM 35 STPL ROT

## (undated) DEVICE — SOLUTION IV IRRIG POUR BRL 0.9% SODIUM CHL 2F7124

## (undated) DEVICE — SUTURE VCRL SZ 2-0 L18IN ABSRB UD POLYGLACTIN 910 BRAID TIE J111T

## (undated) DEVICE — SPONGE LAP W18XL18IN WHT COT 4 PLY FLD STRUNG RADPQ DISP ST

## (undated) DEVICE — CATHETER TRAY 16 FR 5 CC FOL ANTIREFLX SAMPLING PRT DOVER

## (undated) DEVICE — YANKAUER,BULB TIP,W/O VENT,RIGID,STERILE: Brand: MEDLINE

## (undated) DEVICE — NEEDLE SPNL 22GA L3.5IN BLK HUB S STL REG WALL FIT STYL

## (undated) DEVICE — GLOVE ORANGE PI 7 1/2   MSG9075

## (undated) DEVICE — 7496-8Z MINI-PLUS KIT: Brand: DEVON

## (undated) DEVICE — STERILE POLYISOPRENE POWDER-FREE SURGICAL GLOVES: Brand: PROTEXIS

## (undated) DEVICE — DRESSING,GAUZE,XEROFORM,CURAD,1"X8",ST: Brand: CURAD

## (undated) DEVICE — STAPLER INT H4.4X1.5MM DIA75MM 0DEG TI UNIV 6 ROW CART

## (undated) DEVICE — CHLORAPREP 26ML ORANGE

## (undated) DEVICE — TRAY ANES EPI 5CC GLS LL 18GA CUST

## (undated) DEVICE — KIT OR ROOM TURNOVER W/STRAP

## (undated) DEVICE — 6 ML SYRINGE LUER-LOCK TIP: Brand: MONOJECT

## (undated) DEVICE — SEALER ENDOSCP NANO COAT OPN DIV CRV L JAW LIGASURE IMPACT

## (undated) DEVICE — GAUZE,SPONGE,4"X4",8PLY,STRL,LF,10/TRAY: Brand: MEDLINE

## (undated) DEVICE — 3M™ WARMING BLANKET, UPPER BODY, 10 PER CASE, 42268: Brand: BAIR HUGGER™

## (undated) DEVICE — 3 ML SYRINGE LUER-LOCK TIP: Brand: MONOJECT

## (undated) DEVICE — DRAPE THER FLUID WARMING 66X44 IN FLAT SLUSH DBL DISC ORS

## (undated) DEVICE — STAPLER INT L75MM H1.5X1.8X2MM STD TI 6 ROW LIN CUT

## (undated) DEVICE — MAJOR SET UP PK

## (undated) DEVICE — WOUND RETRACTOR AND PROTECTOR: Brand: ALEXIS WOUND PROTECTOR-RETRACTOR